# Patient Record
Sex: FEMALE | Race: BLACK OR AFRICAN AMERICAN | NOT HISPANIC OR LATINO | Employment: PART TIME | URBAN - METROPOLITAN AREA
[De-identification: names, ages, dates, MRNs, and addresses within clinical notes are randomized per-mention and may not be internally consistent; named-entity substitution may affect disease eponyms.]

---
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
## 2023-05-23 ENCOUNTER — HOSPITAL ENCOUNTER (EMERGENCY)
Facility: HOSPITAL | Age: 18
Discharge: HOME | End: 2023-05-23
Attending: EMERGENCY MEDICINE
Payer: COMMERCIAL

## 2023-05-23 ENCOUNTER — OFFICE VISIT (OUTPATIENT)
Dept: URGENT CARE | Facility: CLINIC | Age: 18
End: 2023-05-23
Payer: COMMERCIAL

## 2023-05-23 VITALS
WEIGHT: 293 LBS | SYSTOLIC BLOOD PRESSURE: 138 MMHG | HEIGHT: 66 IN | HEART RATE: 87 BPM | BODY MASS INDEX: 47.09 KG/M2 | OXYGEN SATURATION: 100 % | DIASTOLIC BLOOD PRESSURE: 74 MMHG | RESPIRATION RATE: 17 BRPM | TEMPERATURE: 99 F

## 2023-05-23 VITALS
DIASTOLIC BLOOD PRESSURE: 76 MMHG | SYSTOLIC BLOOD PRESSURE: 112 MMHG | TEMPERATURE: 99 F | HEART RATE: 86 BPM | RESPIRATION RATE: 19 BRPM | OXYGEN SATURATION: 97 %

## 2023-05-23 DIAGNOSIS — S06.9X9A HEAD INJURY WITH LOSS OF CONSCIOUSNESS: Primary | ICD-10-CM

## 2023-05-23 DIAGNOSIS — R55 SYNCOPE, UNSPECIFIED SYNCOPE TYPE: ICD-10-CM

## 2023-05-23 DIAGNOSIS — Z91.89 AT RISK FOR LONG QT SYNDROME: ICD-10-CM

## 2023-05-23 DIAGNOSIS — S06.0XAD CONCUSSION WITH UNKNOWN LOSS OF CONSCIOUSNESS STATUS, SUBSEQUENT ENCOUNTER: Primary | ICD-10-CM
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
## 2023-05-23 LAB
ALBUMIN SERPL BCP-MCNC: 3.4 G/DL (ref 3.5–5.2)
ALP SERPL-CCNC: 101 U/L (ref 55–135)
ALT SERPL W/O P-5'-P-CCNC: 11 U/L (ref 10–44)
ANION GAP SERPL CALC-SCNC: 10 MMOL/L (ref 8–16)
AST SERPL-CCNC: 11 U/L (ref 10–40)
BASOPHILS # BLD AUTO: 0.05 K/UL (ref 0–0.2)
BASOPHILS NFR BLD: 0.7 % (ref 0–1.9)
BILIRUB SERPL-MCNC: 0.2 MG/DL (ref 0.1–1)
BUN SERPL-MCNC: 13 MG/DL (ref 6–20)
CALCIUM SERPL-MCNC: 9.5 MG/DL (ref 8.7–10.5)
CHLORIDE SERPL-SCNC: 108 MMOL/L (ref 95–110)
CO2 SERPL-SCNC: 21 MMOL/L (ref 23–29)
CREAT SERPL-MCNC: 0.9 MG/DL (ref 0.5–1.4)
DIFFERENTIAL METHOD: ABNORMAL
EOSINOPHIL # BLD AUTO: 0.2 K/UL (ref 0–0.5)
EOSINOPHIL NFR BLD: 3.3 % (ref 0–8)
ERYTHROCYTE [DISTWIDTH] IN BLOOD BY AUTOMATED COUNT: 16.3 % (ref 11.5–14.5)
EST. GFR  (NO RACE VARIABLE): >60 ML/MIN/1.73 M^2
GLUCOSE SERPL-MCNC: 77 MG/DL (ref 70–110)
HCT VFR BLD AUTO: 38.9 % (ref 37–48.5)
HCV AB SERPL QL IA: NORMAL
HGB BLD-MCNC: 11.8 G/DL (ref 12–16)
HIV 1+2 AB+HIV1 P24 AG SERPL QL IA: NORMAL
IMM GRANULOCYTES # BLD AUTO: 0.02 K/UL (ref 0–0.04)
IMM GRANULOCYTES NFR BLD AUTO: 0.3 % (ref 0–0.5)
INR PPP: 1 (ref 0.8–1.2)
LYMPHOCYTES # BLD AUTO: 2.3 K/UL (ref 1–4.8)
LYMPHOCYTES NFR BLD: 32 % (ref 18–48)
MCH RBC QN AUTO: 23.3 PG (ref 27–31)
MCHC RBC AUTO-ENTMCNC: 30.3 G/DL (ref 32–36)
MCV RBC AUTO: 77 FL (ref 82–98)
MONOCYTES # BLD AUTO: 0.8 K/UL (ref 0.3–1)
MONOCYTES NFR BLD: 11.3 % (ref 4–15)
NEUTROPHILS # BLD AUTO: 3.8 K/UL (ref 1.8–7.7)
NEUTROPHILS NFR BLD: 52.4 % (ref 38–73)
NRBC BLD-RTO: 0 /100 WBC
PLATELET # BLD AUTO: 282 K/UL (ref 150–450)
PMV BLD AUTO: 11.5 FL (ref 9.2–12.9)
POTASSIUM SERPL-SCNC: 3.7 MMOL/L (ref 3.5–5.1)
PROT SERPL-MCNC: 7.6 G/DL (ref 6–8.4)
PROTHROMBIN TIME: 10.9 SEC (ref 9–12.5)
RBC # BLD AUTO: 5.06 M/UL (ref 4–5.4)
SODIUM SERPL-SCNC: 139 MMOL/L (ref 136–145)
WBC # BLD AUTO: 7.28 K/UL (ref 3.9–12.7)

## 2023-05-23 PROCEDURE — 85610 PROTHROMBIN TIME: CPT

## 2023-05-23 PROCEDURE — 93005 ELECTROCARDIOGRAM TRACING: CPT

## 2023-05-23 PROCEDURE — 99285 EMERGENCY DEPT VISIT HI MDM: CPT | Mod: 25

## 2023-05-23 PROCEDURE — 87389 HIV-1 AG W/HIV-1&-2 AB AG IA: CPT | Performed by: PHYSICIAN ASSISTANT

## 2023-05-23 PROCEDURE — 93010 EKG 12-LEAD: ICD-10-PCS | Mod: ,,, | Performed by: INTERNAL MEDICINE

## 2023-05-23 PROCEDURE — 86803 HEPATITIS C AB TEST: CPT | Performed by: PHYSICIAN ASSISTANT

## 2023-05-23 PROCEDURE — 99214 OFFICE O/P EST MOD 30 MIN: CPT | Mod: S$GLB,,, | Performed by: FAMILY MEDICINE

## 2023-05-23 PROCEDURE — 93010 ELECTROCARDIOGRAM REPORT: CPT | Mod: ,,, | Performed by: INTERNAL MEDICINE

## 2023-05-23 PROCEDURE — 99214 PR OFFICE/OUTPT VISIT, EST, LEVL IV, 30-39 MIN: ICD-10-PCS | Mod: S$GLB,,, | Performed by: FAMILY MEDICINE

## 2023-05-23 PROCEDURE — 25000003 PHARM REV CODE 250: Performed by: EMERGENCY MEDICINE

## 2023-05-23 PROCEDURE — 25000003 PHARM REV CODE 250

## 2023-05-23 PROCEDURE — 99285 PR EMERGENCY DEPT VISIT,LEVEL V: ICD-10-PCS | Mod: ,,, | Performed by: EMERGENCY MEDICINE

## 2023-05-23 PROCEDURE — 99285 EMERGENCY DEPT VISIT HI MDM: CPT | Mod: ,,, | Performed by: EMERGENCY MEDICINE

## 2023-05-23 PROCEDURE — 85025 COMPLETE CBC W/AUTO DIFF WBC: CPT

## 2023-05-23 PROCEDURE — 80053 COMPREHEN METABOLIC PANEL: CPT

## 2023-05-23 RX ORDER — ACETAMINOPHEN 325 MG/1
650 TABLET ORAL
Status: COMPLETED | OUTPATIENT
Start: 2023-05-23 | End: 2023-05-23

## 2023-05-23 RX ORDER — IBUPROFEN 600 MG/1
600 TABLET ORAL
Status: COMPLETED | OUTPATIENT
Start: 2023-05-23 | End: 2023-05-23

## 2023-05-23 RX ORDER — TRAZODONE HYDROCHLORIDE 100 MG/1
100-200 TABLET ORAL NIGHTLY
COMMUNITY
Start: 2023-05-13 | End: 2024-02-26

## 2023-05-23 RX ORDER — IBUPROFEN 600 MG/1
TABLET ORAL
Status: DISCONTINUED
Start: 2023-05-23 | End: 2023-05-23 | Stop reason: HOSPADM

## 2023-05-23 RX ORDER — ACETAMINOPHEN 325 MG/1
650 TABLET ORAL ONCE
Status: DISCONTINUED | OUTPATIENT
Start: 2023-05-23 | End: 2023-05-23 | Stop reason: HOSPADM

## 2023-05-23 RX ADMIN — IBUPROFEN 600 MG: 600 TABLET, FILM COATED ORAL at 09:05

## 2023-05-23 RX ADMIN — ACETAMINOPHEN 650 MG: 325 TABLET ORAL at 07:05

## 2023-05-23 NOTE — PROGRESS NOTES
"Subjective:      Patient ID: Alec Araya is a 17 y.o. female.    Vitals:  temperature is 98.7 °F (37.1 °C). Her blood pressure is 112/76 and her pulse is 86. Her respiration is 19 and oxygen saturation is 97%.     Chief Complaint: Fall    Pt is a  18 yo F who presents to clinic after an unwitnessed fall with loss of consciousness about 12 hours ago after taking trazadone.  Patient reports headache, loss of hearing, and a "groggy feeling"    Fall  The accident occurred 6 to 12 hours ago. The fall occurred while standing. She fell from a height of 1 to 2 ft. She landed on Carpet. There was no blood loss. The point of impact was the head (Back and Shoulder). The pain is present in the head, back and right shoulder. The pain is at a severity of 5/10. The pain is mild. The symptoms are aggravated by movement. Associated symptoms include headaches and a loss of consciousness. Pertinent negatives include no abdominal pain, bowel incontinence, fever, hearing loss, hematuria, nausea, tingling, visual change or vomiting. She has tried rest for the symptoms. The treatment provided no relief.     Constitution: Negative for fever.   Gastrointestinal:  Negative for abdominal pain, nausea, vomiting and bowel incontinence.   Genitourinary:  Negative for hematuria.   Neurological:  Positive for headaches and loss of consciousness.    Objective:     Physical Exam   Constitutional: She is oriented to person, place, and time. She appears well-developed. She is cooperative.  Non-toxic appearance. She does not appear ill. No distress.   HENT:   Head: Normocephalic and atraumatic. Head is without abrasion, without contusion and without laceration.       Ears:   Right Ear: Hearing, tympanic membrane, external ear and ear canal normal. No hemotympanum.   Left Ear: Hearing, tympanic membrane, external ear and ear canal normal. No hemotympanum.   Nose: Nose normal. No mucosal edema, rhinorrhea or nasal deformity. No epistaxis. " Right sinus exhibits no maxillary sinus tenderness and no frontal sinus tenderness. Left sinus exhibits no maxillary sinus tenderness and no frontal sinus tenderness.   Mouth/Throat: Uvula is midline, oropharynx is clear and moist and mucous membranes are normal. No trismus in the jaw. Normal dentition. No uvula swelling. No posterior oropharyngeal erythema.   Eyes: Conjunctivae, EOM and lids are normal. Pupils are equal, round, and reactive to light. Right eye exhibits no discharge. Left eye exhibits no discharge. No scleral icterus. Right pupil is sluggish.   Neck: Trachea normal and phonation normal. Neck supple. No tracheal deviation present. No neck rigidity present. No spinous process tenderness present. No muscular tenderness present.   Cardiovascular: Normal rate, regular rhythm, normal heart sounds and normal pulses.   Pulmonary/Chest: Effort normal and breath sounds normal. No respiratory distress.   Abdominal: Normal appearance and bowel sounds are normal. She exhibits no distension and no mass. Soft. There is no abdominal tenderness.   Musculoskeletal: Normal range of motion.         General: No deformity. Normal range of motion.   Neurological: She is alert and oriented to person, place, and time. She has normal strength. No cranial nerve deficit or sensory deficit. She exhibits normal muscle tone. She displays no seizure activity. Coordination normal. GCS eye subscore is 4. GCS verbal subscore is 5. GCS motor subscore is 6.   Skin: Skin is warm, dry, intact, not diaphoretic and not pale. Capillary refill takes less than 2 seconds. No abrasion, No burn, No bruising and No ecchymosis   Psychiatric: Her speech is normal and behavior is normal. Judgment and thought content normal.   Nursing note and vitals reviewed.    Assessment:     1. Head injury with loss of consciousness        Plan:       Head injury with loss of consciousness           PAtient advised to proceed to ER for further evaluation

## 2023-05-23 NOTE — ED NOTES
Patient identifiers for Alec Araya 17 y.o. female checked and correct.  Chief Complaint   Patient presents with    Fall     Patient states she fell after taking Trazadone, struck head on an iron post, sent from urgent care for further eval/possible concussion     Past Medical History:   Diagnosis Date    ADHD      Allergies reported:   Review of patient's allergies indicates:   Allergen Reactions    Penicillins          LOC: Patient is awake, alert, and aware of environment with an appropriate affect. Patient is oriented x 4 and speaking appropriately.  APPEARANCE: Patient resting comfortably and in no acute distress. Patient is clean and well groomed, patient's clothing is properly fastened.  HEENT: - JVD, + midline trach  SKIN: The skin is warm and dry. Patient has normal skin turgor and moist mucus membranes.   MUSKULOSKELETAL: Patient is moving all extremities well, no obvious deformities noted. Pulses intact. PMS x 4  RESPIRATORY: Airway is open and patent. Respirations are spontaneous and non-labored with normal effort and rate. = CBBS  CARDIAC: No peripheral edema noted. + 2 bilateral pedal and radial pulses, < 3 s cap refill  ABDOMEN: No distention noted. Soft and non-tender upon palpation.  NEUROLOGICAL: pupils 4 mm, PERRL. Facial expression is symmetrical. Hand grasps are equal bilaterally. Normal sensation in all extremities when touched with finger. Endorses 5/10 HA, denies numbness or tingling to extremities and vision changes.

## 2023-05-23 NOTE — ED TRIAGE NOTES
16 y/o F presents to ER with c/c syncope. Pt states that she lost consciousness last night after taking a trazodone and hit her head. Pt currently endorses 5/10 HA, but denies any numbness or tingling to exrtemities or vision changes.

## 2023-05-23 NOTE — ED PROVIDER NOTES
Encounter Date: 5/23/2023       History     Chief Complaint   Patient presents with    Fall     Patient states she fell after taking Trazadone, struck head on an iron post, sent from urgent care for further eval/possible concussion     Ms. Araya is a 17yoF with pmhx of bipolar disorder and ADHD who presented due to fall after taking trazodone x2. Patient stated she was recently prescribed trazodone. She reported vertigo shortly after taking trazodone, which caused her to fall. Patient struck her head on the bedpost and stated she lost consciousness. Unknown time of lost consciousness. Stated she woke up with head, right shoulder, right elbow, right wrist, and right hip pain. Also reported associated diplopia. Incident occurred at approximately 12am. Patient had reported to [facility] urgent care earlier today but was advised to report to [facility]. No images were performed. Patient denied nausea, vomiting, and confusion.     The history is provided by the patient. No  was used.   Review of patient's allergies indicates:   Allergen Reactions    Penicillins      Past Medical History:   Diagnosis Date    ADHD      History reviewed. No pertinent surgical history.  Family History   Problem Relation Age of Onset    Hypertension Paternal Grandmother     Diabetes Maternal Grandmother     Hypertension Maternal Grandmother     Hyperlipidemia Maternal Grandfather     Prostate cancer Maternal Grandfather     Hypertension Father     Eczema Mother     Asthma Mother      Social History     Tobacco Use    Smoking status: Never    Smokeless tobacco: Never   Substance Use Topics    Alcohol use: Never    Drug use: Never     Review of Systems   Constitutional:  Negative for diaphoresis and fever.   HENT:  Negative for nosebleeds.    Eyes:  Positive for visual disturbance. Negative for photophobia, pain and redness.   Respiratory:  Negative for chest tightness, shortness of breath and wheezing.    Cardiovascular:  Negative  for chest pain, palpitations and leg swelling.   Gastrointestinal:  Negative for abdominal pain, nausea and vomiting.   Musculoskeletal:  Positive for arthralgias and neck pain. Negative for back pain, gait problem, joint swelling and neck stiffness.   Skin:  Negative for rash and wound.   Neurological:  Positive for dizziness, syncope and headaches. Negative for speech difficulty and weakness.   Psychiatric/Behavioral:  Negative for confusion.      Physical Exam     Initial Vitals [05/23/23 1620]   BP Pulse Resp Temp SpO2   130/73 94 15 98 °F (36.7 °C) 99 %      MAP       --         Physical Exam    Constitutional: She appears well-developed and well-nourished.   HENT:   Head: Normocephalic. Head is with contusion.   Eyes: Conjunctivae and EOM are normal. Right pupil is round and reactive. Left pupil is round and reactive. Pupils are unequal.   Neck: Neck supple.   Normal range of motion.  Cardiovascular:  Normal rate and regular rhythm.           Pulmonary/Chest: Breath sounds normal. No respiratory distress. She has no wheezes. She exhibits no tenderness.   Abdominal: Abdomen is soft. Bowel sounds are normal. There is no abdominal tenderness.   Musculoskeletal:         General: Tenderness (R shoulder, R elbow, R wrist, R hip) present. No edema.      Cervical back: Normal range of motion and neck supple.     Neurological: She is alert and oriented to person, place, and time. She has normal strength.   Skin: Skin is warm and dry. Capillary refill takes less than 2 seconds.   Psychiatric: She has a normal mood and affect.       ED Course   Procedures  Labs Reviewed   CBC W/ AUTO DIFFERENTIAL - Abnormal; Notable for the following components:       Result Value    Hemoglobin 11.8 (*)     MCV 77 (*)     MCH 23.3 (*)     MCHC 30.3 (*)     RDW 16.3 (*)     All other components within normal limits   COMPREHENSIVE METABOLIC PANEL - Abnormal; Notable for the following components:    CO2 21 (*)     Albumin 3.4 (*)     All  other components within normal limits   HIV 1 / 2 ANTIBODY    Narrative:     Release to patient->Immediate   HEPATITIS C ANTIBODY    Narrative:     Release to patient->Immediate   PROTIME-INR     EKG Readings: (Independently Interpreted)   Initial Reading: No STEMI. Rhythm: Normal Sinus Rhythm. Heart Rate: 73. ST Segments: Normal ST Segments. T Waves: Normal. Axis: Normal.   ECG Results              EKG 12-lead (Final result)  Result time 05/24/23 10:04:27      Final result by Interface, Lab In [facility] (05/24/23 10:04:27)                   Narrative:    Test Reason : Z91.89,    Vent. Rate : 073 BPM     Atrial Rate : 073 BPM     P-R Int : 168 ms          QRS Dur : 096 ms      QT Int : 384 ms       P-R-T Axes : 074 078 061 degrees     QTc Int : 423 ms    Normal sinus rhythm with sinus arrhythmia  Normal ECG  No previous ECGs available  Confirmed by Claus David MD ([id number]) on 5/24/2023 10:04:20 AM    Referred By: [email or web address]   SELF           Confirmed By:Claus David MD                                  Imaging Results              X-Ray Elbow Complete Right (Final result)  Result time 05/23/23 20:19:15      Final result by Beto Hoover MD (05/23/23 20:19:15)                   Impression:      No joint effusion.      Electronically signed by: Beto Hoover  Date:    05/23/2023  Time:    20:19               Narrative:    EXAMINATION:  XR ELBOW COMPLETE 3 VIEW RIGHT    CLINICAL HISTORY:  . Unspecified fall, initial encounter    TECHNIQUE:  AP, lateral, and oblique views of the right elbow were performed.    COMPARISON:  None    FINDINGS:  No acute fracture, dislocation, or traumatic malalignment.  Joint spaces are maintained.  No joint effusion.                                       CT Head Without Contrast (Final result)  Result time 05/23/23 19:42:51      Final result by Vern Milligan MD (05/23/23 19:42:51)                   Impression:      1. No acute intracranial abnormalities.      Electronically  signed by: Vern Milligan MD  Date:    05/23/2023  Time:    19:42               Narrative:    EXAMINATION:  CT HEAD WITHOUT CONTRAST    CLINICAL HISTORY:  Trauma;    TECHNIQUE:  Low dose axial images were obtained through the head.  Coronal and sagittal reformations were also performed. Contrast was not administered.    COMPARISON:  None.    FINDINGS:  There is motion artifact.    There is no evidence of acute major vascular territory infarct, hemorrhage, or mass.  There is no hydrocephalus.  There are no abnormal extra-axial fluid collections.  The paranasal sinuses and mastoid air cells are clear, and there is no evidence of calvarial fracture.  The visualized soft tissues are unremarkable.                                       CT Cervical Spine Without Contrast (Final result)  Result time 05/23/23 19:47:35      Final result by Vern Milligan MD (05/23/23 19:47:35)                   Impression:      1. Allowing for motion artifact, no convincing acute displaced fracture or dislocation of the cervical spine.  Please see above for additional findings.      Electronically signed by: Vern Milligan MD  Date:    05/23/2023  Time:    19:47               Narrative:    EXAMINATION:  CT CERVICAL SPINE WITHOUT CONTRAST    CLINICAL HISTORY:  Trauma;    TECHNIQUE:  Low dose axial images, sagittal and coronal reformations were performed though the cervical spine.  Contrast was not administered.    COMPARISON:  None    FINDINGS:  There is motion artifact.    Images of the lung apices are grossly unremarkable allowing for motion artifact.  The thyroid gland, parotid glands, and remaining visualized salivary glands are grossly unremarkable.  There are several scattered nonenlarged although numerous cervical chain lymph nodes bilaterally.  No significant tonsillar enlargement.  The posterior paraspinous and hypopharyngeal soft tissues are unremarkable.  The airway is patent.    Sagittal reformatted imaging demonstrates  adequate alignment of the cervical spine without significant vertebral body height loss or disc space height loss.  The facet joints are aligned.  AP spinal alignment is unremarkable.                                       X-Ray Shoulder Complete 2 View Right (Final result)  Result time 05/23/23 19:39:25      Final result by Vern Milligan MD (05/23/23 19:39:25)                   Impression:      1. No acute displaced fracture or dislocation of the right shoulder.      Electronically signed by: Vern Milligan MD  Date:    05/23/2023  Time:    19:39               Narrative:    EXAMINATION:  XR SHOULDER COMPLETE 2 OR MORE VIEWS RIGHT    CLINICAL HISTORY:  Unspecified fall, initial encounter    TECHNIQUE:  Two or three views of the right shoulder were performed.    COMPARISON:  None    FINDINGS:  Three views right shoulder.    The right humeral head maintains appropriate relationship with the glenoid.  The acromioclavicular joint is intact.  No acute displaced right rib fracture.  The right lung zones are clear.                                       X-Ray Wrist Complete Right (Final result)  Result time 05/23/23 19:37:57      Final result by Vern Milligan MD (05/23/23 19:37:57)                   Impression:      1. No acute displaced fracture or dislocation of the right wrist.      Electronically signed by: Vern Milligan MD  Date:    05/23/2023  Time:    19:37               Narrative:    EXAMINATION:  XR WRIST COMPLETE 3 VIEWS RIGHT    CLINICAL HISTORY:  Unspecified fall, initial encounter    TECHNIQUE:  PA, lateral, and oblique views of the right wrist were performed.    COMPARISON:  None    FINDINGS:  Three views right wrist.    No acute displaced fracture or dislocation of the right wrist.  No radiopaque foreign body.  There is edema about the dorsal aspect of the hand.                                       X-Ray Hip 2 or 3 views Right (with Pelvis when performed) (Final result)  Result time 05/23/23  19:37:21      Final result by Vern Milligan MD (05/23/23 19:37:21)                   Impression:      1. No acute displaced fracture or dislocation of the right hip.      Electronically signed by: Vern Milligan MD  Date:    05/23/2023  Time:    19:37               Narrative:    EXAMINATION:  XR HIP WITH PELVIS WHEN PERFORMED, 2 OR 3  VIEWS RIGHT    CLINICAL HISTORY:  fall;    TECHNIQUE:  AP view of the pelvis and frog leg lateral view of the right hip were performed.    COMPARISON:  None    FINDINGS:  Two views right hip.    The bilateral sacroiliac joints are intact.  The pubic symphysis is intact.  The bilateral femoroacetabular joints are intact.                                    X-Rays:   Independently Interpreted Readings:   Head CT: No hemorrhage.  No skull fracture.   Other Readings:  Cervical spine CT: no fracture or dislocation  Medications   acetaminophen tablet 650 mg (650 mg Oral Given 5/23/23 1939)   ibuprofen tablet 600 mg (600 mg Oral Given 5/23/23 2102)     Medical Decision Making:   History:   Old Medical Records: I decided to obtain old medical records.  Old Records Summarized: records from clinic visits.  Initial Assessment:   Ms. Araya presented due to pain secondary to trauma from fall. Fall occurred shortly after taking trazodone. Patient reported LOC. Also reported head, right shoulder, right elbow, right wrist, and right hip pain. Ordered images to assess for intracranial hemorrhage and/or fractures.   Differential Diagnosis:   1. Intracranial hemorrhage  2. Contusion  3. Fracture  4. Drug effect  Independently Interpreted Test(s):   I have ordered and independently interpreted X-rays - see prior notes.  I have ordered and independently interpreted EKG Reading(s) - see prior notes  Clinical Tests:   Lab Tests: Ordered  Radiological Study: Ordered  Medical Tests: Ordered  ED Management:  Labs and imaging unremarkable. CTH showed no acute intracranial abnormalities. CT C-spine,  shoulder x-ray, elbow x-ray, wrist x-ray, and hip x-ray showed no fracture or dislocation.   Discussed results with patient. Advised follow up with PCP/psychiatrist and to hold trazodone. She expressed verbal understanding.   Other:   I discussed test(s) with the performing physician.  I have discussed this case with another health care provider.          Attending Attestation:   Physician Attestation Statement for Resident:  As the supervising MD   Physician Attestation Statement: I have personally seen and examined this patient.   I agree with the above history.  -:   As the supervising MD I agree with the above PE.     As the supervising MD I agree with the above treatment, course, plan, and disposition.    I have reviewed and agree with the residents interpretation of the following: lab data, x-rays, CT scans, EKG and rhythm strips.                            Clinical Impression:   Final diagnoses:  [Z91.89] At risk for long QT syndrome  [W19.XXXA] Fall  [R55] Syncope, unspecified syncope type  [S06.0XAD] Concussion with unknown loss of consciousness status, subsequent encounter (Primary)        ED Disposition Condition    Discharge Stable          ED Prescriptions    None       Follow-up Information    None          Beba Gaines DO  Resident  05/23/23 3535       Rony Castle MD  05/24/23 0119

## 2023-05-24 NOTE — DISCHARGE INSTRUCTIONS
Please follow up with your PCP and do NOT resume trazodone unless outpatient physician advise restarting.

## 2023-11-17 ENCOUNTER — HOSPITAL ENCOUNTER (EMERGENCY)
Facility: HOSPITAL | Age: 18
Discharge: HOME | End: 2023-11-18
Attending: EMERGENCY MEDICINE
Payer: COMMERCIAL

## 2023-11-17 DIAGNOSIS — R00.0 TACHYCARDIA: ICD-10-CM

## 2023-11-17 DIAGNOSIS — F12.920 CANNABIS INTOXICATION WITHOUT COMPLICATION: Primary | ICD-10-CM

## 2023-11-17 PROCEDURE — 81025 URINE PREGNANCY TEST: CPT | Performed by: EMERGENCY MEDICINE

## 2023-11-17 PROCEDURE — 99283 EMERGENCY DEPT VISIT LOW MDM: CPT

## 2023-11-18 VITALS
HEART RATE: 88 BPM | OXYGEN SATURATION: 98 % | SYSTOLIC BLOOD PRESSURE: 134 MMHG | DIASTOLIC BLOOD PRESSURE: 76 MMHG | TEMPERATURE: 99 F | RESPIRATION RATE: 17 BRPM

## 2023-11-18 LAB
B-HCG UR QL: NEGATIVE
CTP QC/QA: YES

## 2023-11-18 PROCEDURE — 99283 EMERGENCY DEPT VISIT LOW MDM: CPT

## 2023-11-18 PROCEDURE — 25000003 PHARM REV CODE 250

## 2023-11-18 PROCEDURE — 93005 ELECTROCARDIOGRAM TRACING: CPT

## 2023-11-18 PROCEDURE — 93010 ELECTROCARDIOGRAM REPORT: CPT | Mod: ,,, | Performed by: INTERNAL MEDICINE

## 2023-11-18 PROCEDURE — 93010 EKG 12-LEAD: ICD-10-PCS | Mod: ,,, | Performed by: INTERNAL MEDICINE

## 2023-11-18 RX ORDER — ONDANSETRON 4 MG/1
4 TABLET, ORALLY DISINTEGRATING ORAL
Status: COMPLETED | OUTPATIENT
Start: 2023-11-18 | End: 2023-11-18

## 2023-11-18 RX ADMIN — ONDANSETRON 4 MG: 4 TABLET, ORALLY DISINTEGRATING ORAL at 12:11

## 2023-11-18 NOTE — DISCHARGE INSTRUCTIONS
You were seen in the ED for cannabis intoxication. Symptoms of nausea and tachycardia improved during your stay. Return to the ED if symptoms worse. Follow up with your pcp as needed.

## 2023-11-18 NOTE — ED TRIAGE NOTES
Alec Araya, a 18 y.o. female presents to the ED w/ complaint of mariajuana intoxication. 25 mg edible @ 1900 weakness and nausea. A+ O X 4.     Triage note:  Chief Complaint   Patient presents with    marijuana intoxication     25 mg edible @ 1900 weakness and nausea. A+ O X 4.      Review of patient's allergies indicates:   Allergen Reactions    Penicillins      Past Medical History:   Diagnosis Date    ADHD    Patient identifiers for Alec Araya checked and correct.    LOC: The patient is awake, alert and aware of environment with an appropriate affect, the patient is oriented x 4 and speaking appropriately.    APPEARANCE: Patient resting comfortably and in no acute distress, patient is clean and well groomed, patient's clothing is properly fastened.    SKIN: The skin is warm and dry, color consistent with ethnicity, patient has normal skin turgor and moist mucus membranes, skin intact, no breakdown or bruising noted.    MUSCULOSKELETAL: Patient moving all extremities well, no obvious swelling or deformities noted.    RESPIRATORY: Airway is open and patent, respirations are spontaneous and even, patient has a normal effort and rate.    CARDIAC: Patient has a normal rate and rhythm, no periphreal edema noted, capillary refill < 3 seconds.    ABDOMEN: Soft and non tender to palpation, no distention noted. Patient has some nausea, vomiting, but no diarrhea, or constipation.     NEUROLOGIC: Eyes open spontaneously, PERRL, behavior appropriate to situation, follows commands, facial expression symmetrical, bilateral hand grasp equal and even, purposeful motor response noted, normal sensation in all extremities.     HEENT: No abnormalities noted. White sclera and pupils equal round and reactive to light. Denies headache, dizziness.     : Pt voids independently, denies dysuria, hematuria, frequency.

## 2023-11-18 NOTE — ED PROVIDER NOTES
Encounter Date: 11/17/2023       History     Chief Complaint   Patient presents with    marijuana intoxication     25 mg edible @ 1900 weakness and nausea. A+ O X 4.      HPI  Alec Araya is a 18 y.o. female with a history of anxiety and bipolar disorder presents with complaint of shortness of breath abdominal pain after taking the 25 mg marijuana edible.  She felt like she was on a trip after.  She reported this was the 1st use.  She denied tobacco IV drug use.  She endorses social alcohol use.  She denied chest pain fever, nausea or vomiting.    Review of patient's allergies indicates:   Allergen Reactions    Penicillins      Past Medical History:   Diagnosis Date    ADHD      History reviewed. No pertinent surgical history.  Family History   Problem Relation Age of Onset    Hypertension Paternal Grandmother     Diabetes Maternal Grandmother     Hypertension Maternal Grandmother     Hyperlipidemia Maternal Grandfather     Prostate cancer Maternal Grandfather     Hypertension Father     Eczema Mother     Asthma Mother      Social History     Tobacco Use    Smoking status: Never    Smokeless tobacco: Never   Substance Use Topics    Alcohol use: Never    Drug use: Yes     Types: Marijuana     Review of Systems    Physical Exam     Initial Vitals [11/17/23 2227]   BP Pulse Resp Temp SpO2   118/75 (!) 123 (!) 22 98.1 °F (36.7 °C) 99 %      MAP       --         Physical Exam    Constitutional: She appears well-developed and well-nourished.   HENT:   Head: Normocephalic and atraumatic.   Eyes: EOM are normal. Pupils are equal, round, and reactive to light.   Neck: Neck supple.   Normal range of motion.  Cardiovascular:  Regular rhythm, normal heart sounds and intact distal pulses.     Exam reveals no gallop and no friction rub.       No murmur heard.  tachycardia   Pulmonary/Chest: Breath sounds normal. No respiratory distress. She has no wheezes. She has no rhonchi. She has no rales. She exhibits no  tenderness.   Abdominal: Abdomen is soft. Bowel sounds are normal. She exhibits no distension and no mass. There is abdominal tenderness.   LUQ   There is no rebound and no guarding.   Musculoskeletal:         General: No tenderness or edema. Normal range of motion.      Cervical back: Normal range of motion and neck supple.     Neurological: She is alert and oriented to person, place, and time.         ED Course   Procedures  Labs Reviewed   POCT URINE PREGNANCY     EKG Readings: (Independently Interpreted)   Normal sinus rhythm.  Tachycardic. Rate 100. No st changes concerning for ischemia       Imaging Results    None          Medications - No data to display  Medical Decision Making  Differentials: Recreational drug abuse. Withdrawal    Alec Araya is a 18 y.o. female with a history of anxiety and bipolar disorder presents with complaint of shortness of breath abdominal pain after taking the 25 mg marijuana edible.  Vitals revealed tachycardia with saturation greater than 95%. EKG revealed normal sinus rhythm with tachycardia.  Repeat vitals showed improve rate.  Gave her a dose of Zofran and she reported improved nausea. Dc home in stable condition    Risk  Prescription drug management.              Attending Attestation:   Physician Attestation Statement for Resident:  As the supervising MD   Physician Attestation Statement: I have personally seen and examined this patient.   I agree with the above history.  -:   As the supervising MD I agree with the above PE.     As the supervising MD I agree with the above treatment, course, plan, and disposition.                                        Clinical Impression:  Final diagnoses:  [R00.0] Tachycardia                 Noam Garcia MD  Resident  11/18/23 0111       Marysol Callahan MD  11/18/23 0249

## 2023-11-18 NOTE — ED NOTES
Bed: [facility]  Expected date:   Expected time:   Means of arrival:   Comments:  Marshal

## 2024-02-22 NOTE — PROGRESS NOTES
Chief Complaint: Birth control consult     HPI:      Alec is a 18 y.o.  who presents today for AUB w/ DMPA and for a birth control consult. She started DMPA in 2023. No bleeding in Nov and Dec. In  had bleeding from 16-29th. She is also concerned about weight gain w/ DMPA.     Menarche at age 11   When she was a freshman in high school - went 2 mths w/o period - otherwise denies hx of abnormal periods   Typically - periods monthly, last 3-7 days w/ 2-3 days of heavier flow and cramping     C/ abnormal hair growth on her chin - she currently shaves it     Denies hx of HTN, clotting disorders, liver disease  C/o migraines w/o aura  She denies family history of clotting disorder    Currently on Prozac, Abilify d/t hx of bipolar disorder.     Alec is currently sexually active with a single male partner. She is currently using Depo-Provera for contraception. Desires full STI panel. Hx of CT 2 mths ago - was treated at the time. Endorses new partner since last had screening done.       Physical Exam:      PHYSICAL EXAM:  /80   Pulse 97   Wt (!) 159.5 kg (351 lb 10.1 oz)   LMP 2024   BMI 56.76 kg/m²   Body mass index is 56.76 kg/m².     APPEARANCE: Well nourished, well developed, in no acute distress.  PELVIC:  Deferred     Assessment/Plan:     Contraceptive education  -     POCT Urine Pregnancy    Oral contraception initial prescription    Routine screening for STI (sexually transmitted infection)  -     C. trachomatis/N. gonorrhoeae by AMP DNA [facility]; Vagina  -     Hepatitis B Surface Antigen; Future; Expected date: 2024  -     HIV 1/2 Ag/Ab (4th Gen); Future; Expected date: 2024  -     HEPATITIS C ANTIBODY; Future; Expected date: 2024  -     RPR; Future; Expected date: 2024    Hirsutism  -     Testosterone; Future; Expected date: 2024  -     DHEA-Sulfate; Future; Expected date: 2024      PLAN:    After discussing R/B/As of all contraceptive  options, pt desires trial of COCs.   Counseled patient on how to start the pill (quick start vs. w/ next menstrual cycle), how to take the pill and what to do if she misses a pill.   Counseled patient on expected s/e including BTB, nausea, and breast tenderness and that these s/e tend to improve over the first 2-3 months.  Counseled pt on rare risk of CVD (no CI noted to use) and reviewed symptoms to be aware of.   Will check androgen levels d/t hirsutism.  GC/CT swab self collected by patient. Full serum STI panel ordered.    Follow up in about 3 months (around 5/26/2024).

## 2024-02-26 ENCOUNTER — LAB VISIT (OUTPATIENT)
Dept: LAB | Facility: HOSPITAL | Age: 19
End: 2024-02-26
Payer: COMMERCIAL

## 2024-02-26 ENCOUNTER — OFFICE VISIT (OUTPATIENT)
Dept: OBSTETRICS AND GYNECOLOGY | Facility: CLINIC | Age: 19
End: 2024-02-26
Payer: COMMERCIAL

## 2024-02-26 VITALS
SYSTOLIC BLOOD PRESSURE: 123 MMHG | HEART RATE: 97 BPM | DIASTOLIC BLOOD PRESSURE: 80 MMHG | WEIGHT: 293 LBS | BODY MASS INDEX: 56.76 KG/M2

## 2024-02-26 DIAGNOSIS — Z30.09 CONTRACEPTIVE EDUCATION: Primary | ICD-10-CM

## 2024-02-26 DIAGNOSIS — L68.0 HIRSUTISM: ICD-10-CM

## 2024-02-26 DIAGNOSIS — Z11.3 ROUTINE SCREENING FOR STI (SEXUALLY TRANSMITTED INFECTION): ICD-10-CM

## 2024-02-26 DIAGNOSIS — Z30.011 ORAL CONTRACEPTION INITIAL PRESCRIPTION: ICD-10-CM

## 2024-02-26 PROBLEM — F31.9 BIPOLAR DISORDER: Status: ACTIVE | Noted: 2023-06-09

## 2024-02-26 LAB
B-HCG UR QL: NEGATIVE
CTP QC/QA: YES
DHEA-S SERPL-MCNC: 116.5 UG/DL (ref 134.2–407.4)
HBV SURFACE AG SERPL QL IA: NORMAL
HCV AB SERPL QL IA: NORMAL
HIV 1+2 AB+HIV1 P24 AG SERPL QL IA: NORMAL
TESTOST SERPL-MCNC: 73 NG/DL (ref 5–73)

## 2024-02-26 PROCEDURE — 99214 OFFICE O/P EST MOD 30 MIN: CPT | Mod: S$GLB,,, | Performed by: NURSE PRACTITIONER

## 2024-02-26 PROCEDURE — 84403 ASSAY OF TOTAL TESTOSTERONE: CPT | Performed by: NURSE PRACTITIONER

## 2024-02-26 PROCEDURE — 86803 HEPATITIS C AB TEST: CPT | Performed by: NURSE PRACTITIONER

## 2024-02-26 PROCEDURE — 3079F DIAST BP 80-89 MM HG: CPT | Mod: CPTII,S$GLB,, | Performed by: NURSE PRACTITIONER

## 2024-02-26 PROCEDURE — 3074F SYST BP LT 130 MM HG: CPT | Mod: CPTII,S$GLB,, | Performed by: NURSE PRACTITIONER

## 2024-02-26 PROCEDURE — 82627 DEHYDROEPIANDROSTERONE: CPT | Performed by: NURSE PRACTITIONER

## 2024-02-26 PROCEDURE — 3008F BODY MASS INDEX DOCD: CPT | Mod: CPTII,S$GLB,, | Performed by: NURSE PRACTITIONER

## 2024-02-26 PROCEDURE — 99999 PR PBB SHADOW E&M-EST. PATIENT-LVL III: CPT | Mod: PBBFAC,,, | Performed by: NURSE PRACTITIONER

## 2024-02-26 PROCEDURE — 87491 CHLMYD TRACH DNA AMP PROBE: CPT | Performed by: NURSE PRACTITIONER

## 2024-02-26 PROCEDURE — 87389 HIV-1 AG W/HIV-1&-2 AB AG IA: CPT | Performed by: NURSE PRACTITIONER

## 2024-02-26 PROCEDURE — 1159F MED LIST DOCD IN RCRD: CPT | Mod: CPTII,S$GLB,, | Performed by: NURSE PRACTITIONER

## 2024-02-26 PROCEDURE — 36415 COLL VENOUS BLD VENIPUNCTURE: CPT | Performed by: NURSE PRACTITIONER

## 2024-02-26 PROCEDURE — 87340 HEPATITIS B SURFACE AG IA: CPT | Performed by: NURSE PRACTITIONER

## 2024-02-26 PROCEDURE — 86592 SYPHILIS TEST NON-TREP QUAL: CPT | Performed by: NURSE PRACTITIONER

## 2024-02-26 PROCEDURE — 81025 URINE PREGNANCY TEST: CPT | Mod: S$GLB,,, | Performed by: NURSE PRACTITIONER

## 2024-02-26 RX ORDER — ARIPIPRAZOLE 10 MG/1
10 TABLET ORAL
COMMUNITY

## 2024-02-26 RX ORDER — DROSPIRENONE AND ETHINYL ESTRADIOL 0.02-3(28)
1 KIT ORAL DAILY
Qty: 84 TABLET | Refills: 3 | Status: SHIPPED | OUTPATIENT
Start: 2024-02-26 | End: 2025-02-25

## 2024-02-26 RX ORDER — FLUOXETINE HYDROCHLORIDE 20 MG/1
20 CAPSULE ORAL
COMMUNITY

## 2024-02-27 LAB
C TRACH DNA SPEC QL NAA+PROBE: NOT DETECTED
N GONORRHOEA DNA SPEC QL NAA+PROBE: NOT DETECTED
RPR SER QL: NORMAL

## 2024-04-24 ENCOUNTER — PATIENT MESSAGE (OUTPATIENT)
Dept: OBSTETRICS AND GYNECOLOGY | Facility: CLINIC | Age: 19
End: 2024-04-24
Payer: COMMERCIAL

## 2024-04-29 NOTE — PROGRESS NOTES
"  Chief Complaint: OCP F/u     HPI:      Alec is a 18 y.o.  who presents today for follow-up on OCPs. She was seen on 2024 for birth control consult. She opted to switch from DMPA to OCPs at that time. Started on drospirenone-ethinyl estradioL (ARETHA) 3-0.02 mg per tablet due to concurrent complaint of hirsutism.     She had no bleeding during the first pill packet. Then towards the end of the 2nd pill packet and in to the 3rd pill packet she had 7 days of heavy bleeding with cramping. She was changing her pad 2-3 times per day on heavy flow days. She took Midol with relief but did not want to take at night because it had caffeine in it.    She has otherwise tolerated pill well. She noticed nausea, fatigue, headaches when she first started pills but doing fine now. She has not noticed an improvement in the hair growth on her chin. She does have an appointment with Dermatology in  for further evaluation of this.   She is remembering to take her birth control pill every day.     Alec  is currently sexually active . She is currently using oral contraceptives (estrogen/progesterone) for contraception. She desires full STI screening today.     She is also c/o vaginal discharge, mild vaginal itching/irritation. Denies any vaginal odor or pelvic pain.     Gardasil:Completed     Physical Exam:      PHYSICAL EXAM:  /82   Ht 5' 6" (1.676 m)   Wt (!) 160.5 kg (353 lb 13.4 oz)   LMP 2024   BMI 57.11 kg/m²   Body mass index is 57.11 kg/m².     APPEARANCE: Well nourished, well developed, in no acute distress.  PELVIC:  External genitalia and urethra within normal limits. Vagina without lesions, without discharge, without erythema, without ulcers.  Cervix without cervical motion tenderness, non-friable. Bimanual exam deferred    A female chaperone was present for the pelvic exam.     Assessment/Plan:     Oral contraceptive pill surveillance    Routine screening for STI (sexually transmitted " infection)  -     C. trachomatis/N. gonorrhoeae by AMP DNA [facility]; Cervicovaginal  -     Vaginosis Screen by DNA Probe  -     Hepatitis B Surface Antigen; Future; Expected date: 04/30/2024  -     Hepatitis C Antibody; Future; Expected date: 04/30/2024  -     HIV 1/2 Ag/Ab (4th Gen); Future; Expected date: 04/30/2024  -     Treponema Pallidium Antibodies IgG, IgM; Future; Expected date: 04/30/2024    Vaginal discharge  -     C. trachomatis/N. gonorrhoeae by AMP DNA [facility]; Cervicovaginal  -     Vaginosis Screen by DNA Probe    Negative pregnancy test  -     POCT Urine Pregnancy    PLAN:    Counseled patient on option to either do extended or continuous cycling of OCPs to allow bleeding every 3 months or suppress menses completely. Also discussed option of increasing estrogen potency if she continues to have breakthrough bleeding. Patient declines at this time and desires to continue with regular dosing of her current OCP. Has refilled available.   Affirm collected for further evaluation of vaginitis symptoms.   GC/CT collected and serum STI screening ordered.     Follow up if symptoms worsen or fail to improve, for WWE in 2/2024 .

## 2024-04-30 ENCOUNTER — LAB VISIT (OUTPATIENT)
Dept: LAB | Facility: HOSPITAL | Age: 19
End: 2024-04-30
Payer: COMMERCIAL

## 2024-04-30 ENCOUNTER — OFFICE VISIT (OUTPATIENT)
Dept: OBSTETRICS AND GYNECOLOGY | Facility: CLINIC | Age: 19
End: 2024-04-30
Payer: COMMERCIAL

## 2024-04-30 VITALS
WEIGHT: 293 LBS | BODY MASS INDEX: 47.09 KG/M2 | SYSTOLIC BLOOD PRESSURE: 128 MMHG | DIASTOLIC BLOOD PRESSURE: 82 MMHG | HEIGHT: 66 IN

## 2024-04-30 DIAGNOSIS — N89.8 VAGINAL DISCHARGE: ICD-10-CM

## 2024-04-30 DIAGNOSIS — Z32.02 NEGATIVE PREGNANCY TEST: ICD-10-CM

## 2024-04-30 DIAGNOSIS — Z30.41 ORAL CONTRACEPTIVE PILL SURVEILLANCE: Primary | ICD-10-CM

## 2024-04-30 DIAGNOSIS — Z11.3 ROUTINE SCREENING FOR STI (SEXUALLY TRANSMITTED INFECTION): ICD-10-CM

## 2024-04-30 LAB
B-HCG UR QL: NEGATIVE
CTP QC/QA: YES
HBV SURFACE AG SERPL QL IA: NORMAL
HCV AB SERPL QL IA: NORMAL
HIV 1+2 AB+HIV1 P24 AG SERPL QL IA: NORMAL
TREPONEMA PALLIDUM IGG+IGM AB [PRESENCE] IN SERUM OR PLASMA BY IMMUNOASSAY: NONREACTIVE

## 2024-04-30 PROCEDURE — 87389 HIV-1 AG W/HIV-1&-2 AB AG IA: CPT | Performed by: NURSE PRACTITIONER

## 2024-04-30 PROCEDURE — 3008F BODY MASS INDEX DOCD: CPT | Mod: CPTII,S$GLB,, | Performed by: NURSE PRACTITIONER

## 2024-04-30 PROCEDURE — 99459 PELVIC EXAMINATION: CPT | Mod: S$GLB,,, | Performed by: NURSE PRACTITIONER

## 2024-04-30 PROCEDURE — 36415 COLL VENOUS BLD VENIPUNCTURE: CPT | Performed by: NURSE PRACTITIONER

## 2024-04-30 PROCEDURE — 3074F SYST BP LT 130 MM HG: CPT | Mod: CPTII,S$GLB,, | Performed by: NURSE PRACTITIONER

## 2024-04-30 PROCEDURE — 87491 CHLMYD TRACH DNA AMP PROBE: CPT | Performed by: NURSE PRACTITIONER

## 2024-04-30 PROCEDURE — 3079F DIAST BP 80-89 MM HG: CPT | Mod: CPTII,S$GLB,, | Performed by: NURSE PRACTITIONER

## 2024-04-30 PROCEDURE — 1159F MED LIST DOCD IN RCRD: CPT | Mod: CPTII,S$GLB,, | Performed by: NURSE PRACTITIONER

## 2024-04-30 PROCEDURE — 81025 URINE PREGNANCY TEST: CPT | Mod: S$GLB,,, | Performed by: NURSE PRACTITIONER

## 2024-04-30 PROCEDURE — 99999 PR PBB SHADOW E&M-EST. PATIENT-LVL III: CPT | Mod: PBBFAC,,, | Performed by: NURSE PRACTITIONER

## 2024-04-30 PROCEDURE — 81514 NFCT DS BV&VAGINITIS DNA ALG: CPT | Performed by: NURSE PRACTITIONER

## 2024-04-30 PROCEDURE — 87340 HEPATITIS B SURFACE AG IA: CPT | Performed by: NURSE PRACTITIONER

## 2024-04-30 PROCEDURE — 86593 SYPHILIS TEST NON-TREP QUANT: CPT | Performed by: NURSE PRACTITIONER

## 2024-04-30 PROCEDURE — 86803 HEPATITIS C AB TEST: CPT | Performed by: NURSE PRACTITIONER

## 2024-04-30 PROCEDURE — 99213 OFFICE O/P EST LOW 20 MIN: CPT | Mod: S$GLB,,, | Performed by: NURSE PRACTITIONER

## 2024-05-01 ENCOUNTER — TELEPHONE (OUTPATIENT)
Dept: OBSTETRICS AND GYNECOLOGY | Facility: CLINIC | Age: 19
End: 2024-05-01
Payer: COMMERCIAL

## 2024-05-01 DIAGNOSIS — A54.9 GONORRHEA: Primary | ICD-10-CM

## 2024-05-01 DIAGNOSIS — A74.9 CHLAMYDIA: ICD-10-CM

## 2024-05-01 DIAGNOSIS — N76.0 BACTERIAL VAGINOSIS: ICD-10-CM

## 2024-05-01 DIAGNOSIS — B96.89 BACTERIAL VAGINOSIS: ICD-10-CM

## 2024-05-01 LAB
BACTERIAL VAGINOSIS DNA: POSITIVE
C TRACH DNA SPEC QL NAA+PROBE: DETECTED
CANDIDA GLABRATA DNA: NEGATIVE
CANDIDA KRUSEI DNA: NEGATIVE
CANDIDA RRNA VAG QL PROBE: NEGATIVE
N GONORRHOEA DNA SPEC QL NAA+PROBE: DETECTED
T VAGINALIS RRNA GENITAL QL PROBE: NEGATIVE

## 2024-05-01 RX ORDER — METRONIDAZOLE 500 MG/1
500 TABLET ORAL EVERY 12 HOURS
Qty: 14 TABLET | Refills: 0 | Status: SHIPPED | OUTPATIENT
Start: 2024-05-01 | End: 2024-05-08

## 2024-05-01 RX ORDER — DOXYCYCLINE 100 MG/1
100 CAPSULE ORAL EVERY 12 HOURS
Qty: 14 CAPSULE | Refills: 0 | Status: SHIPPED | OUTPATIENT
Start: 2024-05-01 | End: 2024-05-08

## 2024-05-01 RX ORDER — CEFTRIAXONE 1 G/1
1 INJECTION, POWDER, FOR SOLUTION INTRAMUSCULAR; INTRAVENOUS ONCE
Qty: 1 G | Refills: 0 | Status: SHIPPED | OUTPATIENT
Start: 2024-05-01 | End: 2024-05-01

## 2024-05-01 RX ORDER — DIPHENHYDRAMINE HYDROCHLORIDE 50 MG/ML
50 INJECTION INTRAMUSCULAR; INTRAVENOUS
Status: COMPLETED | OUTPATIENT
Start: 2024-05-02 | End: 2024-05-02

## 2024-05-01 NOTE — TELEPHONE ENCOUNTER
Spoke with pt and scheduled injection appointment to be done on 5/2/2024 reminder to be sent to patient via my chart today. Pt verbalized understanding, no questions at this time

## 2024-05-01 NOTE — TELEPHONE ENCOUNTER
Spoke to patient. Informed her of GC/CT/BV infections.     CT - Doxycyline 100 mg BID x 7 days  GC - * For persons weighing ?150 kg, 1 g ceftriaxone should be administered    Allergy to penicillin when she was a baby. Unsure of exact reaction. Has not had exposure to PCN since this.   Discussed that per CDC - the risk for penicillin cross-reactivity is highest with first-generation cephalosporins but is rare (<1%) with third-generation cephalosporins (e.g., ceftriaxone and cefixime). Will proceed with Ceftriaxone injection after discussing R/B/As.     BV - Flagyl 500 mg BID x 7 days. Discussed avoiding alcohol during treatment.     Discussed recommendation to avoid sex until she has completed treatment.     She is no longer with her partner. Discussed recommendation for partner to be treated. She declines EPT at this time.     Discussed retest in 3 months.     All questions answered. Will route note to MA to assist patient in getting schedule at injection clinic for Ceftriaxone injection.

## 2024-05-02 ENCOUNTER — CLINICAL SUPPORT (OUTPATIENT)
Dept: OBSTETRICS AND GYNECOLOGY | Facility: CLINIC | Age: 19
End: 2024-05-02
Payer: COMMERCIAL

## 2024-05-02 ENCOUNTER — PATIENT MESSAGE (OUTPATIENT)
Dept: ADMINISTRATIVE | Facility: OTHER | Age: 19
End: 2024-05-02
Payer: COMMERCIAL

## 2024-05-02 DIAGNOSIS — A54.9 GONORRHEA: Primary | ICD-10-CM

## 2024-05-02 PROCEDURE — 96372 THER/PROPH/DIAG INJ SC/IM: CPT | Mod: S$GLB,,, | Performed by: NURSE PRACTITIONER

## 2024-05-02 PROCEDURE — 99999 PR PBB SHADOW E&M-EST. PATIENT-LVL I: CPT | Mod: PBBFAC,,,

## 2024-05-02 RX ORDER — CEFTRIAXONE 1 G/1
1 INJECTION, POWDER, FOR SOLUTION INTRAMUSCULAR; INTRAVENOUS
Status: COMPLETED | OUTPATIENT
Start: 2024-05-02 | End: 2024-05-02

## 2024-05-02 RX ADMIN — DIPHENHYDRAMINE HYDROCHLORIDE 50 MG: 50 INJECTION INTRAMUSCULAR; INTRAVENOUS at 08:05

## 2024-05-02 RX ADMIN — CEFTRIAXONE 1 G: 1 INJECTION, POWDER, FOR SOLUTION INTRAMUSCULAR; INTRAVENOUS at 09:05

## 2024-05-02 NOTE — PROGRESS NOTES
HERE FOR ROCEPHIN INJECTION 1G/ML PATIENT WITHOUT COMPLAINT AT THIS TIME. ALLERGIES REVIEWED, INJECTION GIVEN ADVISED TO WAIT IN LOBBY FOR 20 MINUTES POST INJECTION AND TO REPORT ANY ADVERSE REACTION. NO PAN NOTED PRIOR TO OR POST INJECTION .        SITE:     ORDER VERIFIED    PACKAGE INSPECTED, STORAGE VERIFIED AS WELL AS EXPIRATION VERIFIED BY NURSE       PATIENT SUPPLIED     SEE [employer] CARD FOR INFORMATION    8655-7604-03:NDC  [id number]:LOT  12/2025:EXP      Patient here for DEPHENHYDRAMINE injection. No pain noted, injection given. Patient tolerated well advised to wait in lobby 5 minutes and report any adverse reactions.       Site:

## 2024-06-05 ENCOUNTER — PATIENT MESSAGE (OUTPATIENT)
Dept: OBSTETRICS AND GYNECOLOGY | Facility: CLINIC | Age: 19
End: 2024-06-05
Payer: COMMERCIAL

## 2024-06-06 ENCOUNTER — OFFICE VISIT (OUTPATIENT)
Dept: OBSTETRICS AND GYNECOLOGY | Facility: CLINIC | Age: 19
End: 2024-06-06
Payer: COMMERCIAL

## 2024-06-06 ENCOUNTER — LAB VISIT (OUTPATIENT)
Dept: LAB | Facility: HOSPITAL | Age: 19
End: 2024-06-06
Payer: COMMERCIAL

## 2024-06-06 ENCOUNTER — TELEPHONE (OUTPATIENT)
Dept: OBSTETRICS AND GYNECOLOGY | Facility: CLINIC | Age: 19
End: 2024-06-06
Payer: COMMERCIAL

## 2024-06-06 VITALS
HEART RATE: 97 BPM | SYSTOLIC BLOOD PRESSURE: 106 MMHG | WEIGHT: 293 LBS | BODY MASS INDEX: 57.5 KG/M2 | DIASTOLIC BLOOD PRESSURE: 69 MMHG

## 2024-06-06 DIAGNOSIS — Z11.3 ROUTINE SCREENING FOR STI (SEXUALLY TRANSMITTED INFECTION): ICD-10-CM

## 2024-06-06 DIAGNOSIS — R30.0 DYSURIA: Primary | ICD-10-CM

## 2024-06-06 DIAGNOSIS — R30.0 DYSURIA: ICD-10-CM

## 2024-06-06 LAB
BILIRUB SERPL-MCNC: NORMAL MG/DL
BLOOD, POC UA: 250
GLUCOSE UR QL STRIP: NORMAL
HBV SURFACE AG SERPL QL IA: NORMAL
HCV AB SERPL QL IA: NORMAL
HIV 1+2 AB+HIV1 P24 AG SERPL QL IA: NORMAL
KETONES UR QL STRIP: NORMAL
LEUKOCYTE ESTERASE URINE, POC: NORMAL
NITRITE, POC UA: NORMAL
PH, POC UA: 7
PROTEIN, POC: NORMAL
SPECIFIC GRAVITY, POC UA: 1
TREPONEMA PALLIDUM IGG+IGM AB [PRESENCE] IN SERUM OR PLASMA BY IMMUNOASSAY: NONREACTIVE
UROBILINOGEN, POC UA: NORMAL

## 2024-06-06 PROCEDURE — 87491 CHLMYD TRACH DNA AMP PROBE: CPT | Performed by: NURSE PRACTITIONER

## 2024-06-06 PROCEDURE — 87389 HIV-1 AG W/HIV-1&-2 AB AG IA: CPT | Performed by: NURSE PRACTITIONER

## 2024-06-06 PROCEDURE — 87340 HEPATITIS B SURFACE AG IA: CPT | Performed by: NURSE PRACTITIONER

## 2024-06-06 PROCEDURE — 1159F MED LIST DOCD IN RCRD: CPT | Mod: CPTII,S$GLB,, | Performed by: NURSE PRACTITIONER

## 2024-06-06 PROCEDURE — 3008F BODY MASS INDEX DOCD: CPT | Mod: CPTII,S$GLB,, | Performed by: NURSE PRACTITIONER

## 2024-06-06 PROCEDURE — 86803 HEPATITIS C AB TEST: CPT | Performed by: NURSE PRACTITIONER

## 2024-06-06 PROCEDURE — 99999 PR PBB SHADOW E&M-EST. PATIENT-LVL III: CPT | Mod: PBBFAC,,, | Performed by: NURSE PRACTITIONER

## 2024-06-06 PROCEDURE — 87591 N.GONORRHOEAE DNA AMP PROB: CPT | Performed by: NURSE PRACTITIONER

## 2024-06-06 PROCEDURE — 86593 SYPHILIS TEST NON-TREP QUANT: CPT | Performed by: NURSE PRACTITIONER

## 2024-06-06 PROCEDURE — 3078F DIAST BP <80 MM HG: CPT | Mod: CPTII,S$GLB,, | Performed by: NURSE PRACTITIONER

## 2024-06-06 PROCEDURE — 3074F SYST BP LT 130 MM HG: CPT | Mod: CPTII,S$GLB,, | Performed by: NURSE PRACTITIONER

## 2024-06-06 PROCEDURE — 36415 COLL VENOUS BLD VENIPUNCTURE: CPT | Performed by: NURSE PRACTITIONER

## 2024-06-06 PROCEDURE — 99214 OFFICE O/P EST MOD 30 MIN: CPT | Mod: S$GLB,,, | Performed by: NURSE PRACTITIONER

## 2024-06-06 PROCEDURE — 81003 URINALYSIS AUTO W/O SCOPE: CPT | Mod: QW,S$GLB,, | Performed by: NURSE PRACTITIONER

## 2024-06-06 PROCEDURE — 87086 URINE CULTURE/COLONY COUNT: CPT | Performed by: NURSE PRACTITIONER

## 2024-06-06 RX ORDER — NITROFURANTOIN 25; 75 MG/1; MG/1
100 CAPSULE ORAL 2 TIMES DAILY
Qty: 10 CAPSULE | Refills: 0 | Status: SHIPPED | OUTPATIENT
Start: 2024-06-06 | End: 2024-06-11

## 2024-06-06 NOTE — PROGRESS NOTES
Chief Complaint: UTI concerns      HPI:      Alec is a 18 y.o.  who presents today for UTI concerns.  Ongoing x 2 days, urinary urgency and frequency. She is noticing stinging towards the end of urinating.  She also noticed blood in her urine; not currently on her menses. She denies any back pain, fever/body aches/chills. She has noticed pelvic pressure.     Denies any vaginal concerns.     Alec  is currently sexually active . She is currently using oral contraceptives (estrogen/progesterone) for contraception. Desires STI screening     Gardasil:Completed     Physical Exam:      PHYSICAL EXAM:  /69   Pulse 97   Wt (!) 161.6 kg (356 lb 4.2 oz)   LMP 2024   BMI 57.50 kg/m²   Body mass index is 57.5 kg/m².     APPEARANCE: Well nourished, well developed, in no acute distress.    Assessment/Plan:     Dysuria  -     POCT URINALYSIS  -     nitrofurantoin, macrocrystal-monohydrate, (MACROBID) 100 MG capsule; Take 1 capsule (100 mg total) by mouth 2 (two) times daily. for 5 days  Dispense: 10 capsule; Refill: 0  -     CULTURE, URINE  -     C. trachomatis/N. gonorrhoeae by AMP DNA [facility]; Vagina  -     Hepatitis B Surface Antigen; Future; Expected date: 2024  -     Hepatitis C Antibody; Future; Expected date: 2024  -     HIV 1/2 Ag/Ab (4th Gen); Future; Expected date: 2024  -     Treponema Pallidium Antibodies IgG, IgM; Future; Expected date: 2024    Routine screening for STI (sexually transmitted infection)  -     nitrofurantoin, macrocrystal-monohydrate, (MACROBID) 100 MG capsule; Take 1 capsule (100 mg total) by mouth 2 (two) times daily. for 5 days  Dispense: 10 capsule; Refill: 0  -     CULTURE, URINE  -     C. trachomatis/N. gonorrhoeae by AMP DNA [facility]; Vagina  -     Hepatitis B Surface Antigen; Future; Expected date: 2024  -     Hepatitis C Antibody; Future; Expected date: 2024  -     HIV 1/2 Ag/Ab (4th Gen); Future; Expected date: 2024  -      Treponema Pallidium Antibodies IgG, IgM; Future; Expected date: 06/06/2024      PLAN:    UA pertinent for trace leuks/protein, +RBCs. Will send for urine culture.   Will treat empirically for UTI with Macrobid 100 mg BID x 5 days.   GC/CT self-collected. Serum STI screening ordered.     Follow up if symptoms worsen or fail to improve.

## 2024-06-06 NOTE — TELEPHONE ENCOUNTER
Called pt regarding a portal message she sent requesting an appointment today in concerns of a possible uti. Pt was offered an appt for this afternoon. Pt agreed and Pt v/u

## 2024-06-07 LAB
BACTERIA UR CULT: NORMAL
BACTERIA UR CULT: NORMAL

## 2024-06-10 ENCOUNTER — LAB VISIT (OUTPATIENT)
Dept: LAB | Facility: HOSPITAL | Age: 19
End: 2024-06-10
Payer: COMMERCIAL

## 2024-06-10 DIAGNOSIS — R31.9 HEMATURIA, UNSPECIFIED TYPE: ICD-10-CM

## 2024-06-10 DIAGNOSIS — R31.9 HEMATURIA, UNSPECIFIED TYPE: Primary | ICD-10-CM

## 2024-06-10 LAB
BACTERIA #/AREA URNS HPF: ABNORMAL /HPF
MICROSCOPIC COMMENT: ABNORMAL
RBC #/AREA URNS HPF: 8 /HPF (ref 0–4)
SQUAMOUS #/AREA URNS HPF: 18 /HPF
WBC #/AREA URNS HPF: >100 /HPF (ref 0–5)
WBC CLUMPS URNS QL MICRO: ABNORMAL

## 2024-06-10 PROCEDURE — 81000 URINALYSIS NONAUTO W/SCOPE: CPT | Performed by: NURSE PRACTITIONER

## 2024-06-11 ENCOUNTER — TELEPHONE (OUTPATIENT)
Dept: OBSTETRICS AND GYNECOLOGY | Facility: CLINIC | Age: 19
End: 2024-06-11
Payer: COMMERCIAL

## 2024-06-11 DIAGNOSIS — R31.9 HEMATURIA, UNSPECIFIED TYPE: ICD-10-CM

## 2024-06-11 DIAGNOSIS — R30.0 DYSURIA: ICD-10-CM

## 2024-06-11 DIAGNOSIS — A54.9 GONORRHEA: Primary | ICD-10-CM

## 2024-06-11 LAB
C TRACH DNA SPEC QL NAA+PROBE: NOT DETECTED
N GONORRHOEA DNA SPEC QL NAA+PROBE: DETECTED

## 2024-06-11 RX ORDER — DIPHENHYDRAMINE HYDROCHLORIDE 50 MG/ML
50 INJECTION INTRAMUSCULAR; INTRAVENOUS
Status: COMPLETED | OUTPATIENT
Start: 2024-06-11 | End: 2024-06-12

## 2024-06-11 RX ORDER — CEFTRIAXONE 1 G/1
1 INJECTION, POWDER, FOR SOLUTION INTRAMUSCULAR; INTRAVENOUS ONCE
Qty: 1 G | Refills: 0 | Status: SHIPPED | OUTPATIENT
Start: 2024-06-11 | End: 2024-06-11

## 2024-06-11 NOTE — TELEPHONE ENCOUNTER
Call to patient. Discussed GC+. History of GC on 4/30. Has treatment for this on 5/2.   She reports a new partner since being treated.   Suspect new infection seeing that she was treated appropriately, had a new partner and retest was >4 weeks since treatment.     She reports using condoms sometimes.     She is interested in EPT and will send a message with partner's information.     She is also interested in discussing Doxy PEP and HIV PrEP. Virtual visit scheduled to discuss this further.     Discussed patient should abstain from sex for 7 days after treatment, until partner/s are treated and until resolution in symptoms.     Will cancel Urology appt for today seeing that urinary symptoms very likely related to gonorrhea infection.     GC - * For persons weighing ?150 kg, 1 g ceftriaxone should be administered     Allergy to penicillin when she was a baby. Unsure of exact reaction. Has not had exposure to PCN since this.   Discussed that per CDC - the risk for penicillin cross-reactivity is highest with first-generation cephalosporins but is rare (<1%) with third-generation cephalosporins (e.g., ceftriaxone and cefixime). Will proceed with Ceftriaxone injection after discussing R/B/As.     Benadryl injection to be given prior.

## 2024-06-12 ENCOUNTER — CLINICAL SUPPORT (OUTPATIENT)
Dept: OBSTETRICS AND GYNECOLOGY | Facility: CLINIC | Age: 19
End: 2024-06-12
Payer: COMMERCIAL

## 2024-06-12 DIAGNOSIS — A54.9 GONORRHEA: Primary | ICD-10-CM

## 2024-06-12 PROCEDURE — 96372 THER/PROPH/DIAG INJ SC/IM: CPT | Mod: S$GLB,,, | Performed by: NURSE PRACTITIONER

## 2024-06-12 PROCEDURE — 99999 PR PBB SHADOW E&M-EST. PATIENT-LVL I: CPT | Mod: PBBFAC,,,

## 2024-06-12 RX ORDER — CEFTRIAXONE 1 G/1
1 INJECTION, POWDER, FOR SOLUTION INTRAMUSCULAR; INTRAVENOUS
Status: COMPLETED | OUTPATIENT
Start: 2024-06-12 | End: 2024-06-12

## 2024-06-12 RX ADMIN — DIPHENHYDRAMINE HYDROCHLORIDE 50 MG: 50 INJECTION INTRAMUSCULAR; INTRAVENOUS at 01:06

## 2024-06-12 RX ADMIN — CEFTRIAXONE 1 G: 1 INJECTION, POWDER, FOR SOLUTION INTRAMUSCULAR; INTRAVENOUS at 01:06

## 2024-06-12 NOTE — PROGRESS NOTES
Patient here for diphenhydramine 50mg/ml injection. No pain noted, injection given. Patient tolerated well advised to wait in lobby 5 minutes and report any adverse reactions.       Site: ld      HERE FOR ROCEPHIN INJECTION 1 G/ML PATIENT WITHOUT COMPLAINT AT THIS TIME. ALLERGIES REVIEWED, INJECTION GIVEN ADVISED TO WAIT IN LOBBY FOR 20 MINUTES POST INJECTION AND TO REPORT ANY ADVERSE REACTION. NO PAN NOTED PRIOR TO OR POST INJECTION .        SITE: lb    ORDER VERIFIED    PACKAGE INSPECTED, STORAGE VERIFIED AS WELL AS EXPIRATION VERIFIED BY NURSE       PATIENT SUPPLIED  NDC:2340-8626-29  LOT:[id number]  EXP:12/2025    SEE MED CARD FOR INFORMATION

## 2024-06-13 ENCOUNTER — OFFICE VISIT (OUTPATIENT)
Dept: OBSTETRICS AND GYNECOLOGY | Facility: CLINIC | Age: 19
End: 2024-06-13
Payer: COMMERCIAL

## 2024-06-13 ENCOUNTER — OFFICE VISIT (OUTPATIENT)
Dept: DERMATOLOGY | Facility: CLINIC | Age: 19
End: 2024-06-13
Payer: COMMERCIAL

## 2024-06-13 DIAGNOSIS — Z86.19 HISTORY OF CHLAMYDIA: Primary | ICD-10-CM

## 2024-06-13 DIAGNOSIS — L68.0 HIRSUTISM: ICD-10-CM

## 2024-06-13 DIAGNOSIS — R31.9 HEMATURIA, UNSPECIFIED TYPE: ICD-10-CM

## 2024-06-13 DIAGNOSIS — R30.0 DYSURIA: ICD-10-CM

## 2024-06-13 DIAGNOSIS — Z86.19 HISTORY OF GONORRHEA: ICD-10-CM

## 2024-06-13 DIAGNOSIS — L70.0 ACNE VULGARIS: Primary | ICD-10-CM

## 2024-06-13 PROCEDURE — 1160F RVW MEDS BY RX/DR IN RCRD: CPT | Mod: CPTII,S$GLB,, | Performed by: STUDENT IN AN ORGANIZED HEALTH CARE EDUCATION/TRAINING PROGRAM

## 2024-06-13 PROCEDURE — 99999 PR PBB SHADOW E&M-EST. PATIENT-LVL III: CPT | Mod: PBBFAC,,, | Performed by: STUDENT IN AN ORGANIZED HEALTH CARE EDUCATION/TRAINING PROGRAM

## 2024-06-13 PROCEDURE — 1159F MED LIST DOCD IN RCRD: CPT | Mod: CPTII,S$GLB,, | Performed by: STUDENT IN AN ORGANIZED HEALTH CARE EDUCATION/TRAINING PROGRAM

## 2024-06-13 PROCEDURE — 99204 OFFICE O/P NEW MOD 45 MIN: CPT | Mod: S$GLB,,, | Performed by: STUDENT IN AN ORGANIZED HEALTH CARE EDUCATION/TRAINING PROGRAM

## 2024-06-13 PROCEDURE — G2211 COMPLEX E/M VISIT ADD ON: HCPCS | Mod: S$GLB,,, | Performed by: STUDENT IN AN ORGANIZED HEALTH CARE EDUCATION/TRAINING PROGRAM

## 2024-06-13 RX ORDER — CLINDAMYCIN PHOSPHATE 11.9 MG/ML
SOLUTION TOPICAL DAILY
Qty: 60 ML | Refills: 3 | Status: SHIPPED | OUTPATIENT
Start: 2024-06-13

## 2024-06-13 RX ORDER — TRETINOIN 0.25 MG/G
CREAM TOPICAL NIGHTLY
Qty: 45 G | Refills: 3 | Status: SHIPPED | OUTPATIENT
Start: 2024-06-13

## 2024-06-13 NOTE — PROGRESS NOTES
Chief Complaint: GC F/u     The patient location is: Car (not driving)  The chief complaint leading to consultation is: GC F/u, Discussed HIV PrEP    Visit type: audiovisual    Face to Face time with patient: 15  20 minutes of total time spent on the encounter, which includes face to face time and non-face to face time preparing to see the patient (eg, review of tests), Obtaining and/or reviewing separately obtained history, Documenting clinical information in the electronic or other health record, Independently interpreting results (not separately reported) and communicating results to the patient/family/caregiver, or Care coordination (not separately reported).     Each patient to whom he or she provides medical services by telemedicine is:  (1) informed of the relationship between the physician and patient and the respective role of any other health care provider with respect to management of the patient; and (2) notified that he or she may decline to receive medical services by telemedicine and may withdraw from such care at any time.    HPI:      Alec is a 18 y.o.  who presents today for gonorrhea follow-up and to discuss HIV PrEP.      She was previously seen due to UTI concerns. Urine culture negative for UTI. She took 3 doses of Macrobid and then stopped.   Hx of GC ( and ) - new infection suspected  Hx of CT ()    She had Ceftriaxone injection yesterday. Recent sexual partner is being seen by a provider today to be treated.     Menses are regular w/ OCPs. Patient's last menstrual period was 2024.     Alec  is currently sexually active . She is currently using oral contraceptives (estrogen/progesterone) for contraception. Uses condoms sometimes.     Gardasil:Completed     Physical Exam:      PHYSICAL EXAM:  LMP 2024   There is no height or weight on file to calculate BMI.     APPEARANCE: Well nourished, well developed, in no acute distress.      Assessment/Plan:      History of chlamydia    History of gonorrhea    Dysuria    Hematuria, unspecified type    PLAN:    Discussed PrEP options with patient - TDF/FTC (Truvada) PO daily and Cabotegravir (Apretude) IM on day 0, at 4 weeks, and then every 8 weeks  Counseled pt that when taken correctly, oral PrEP reduces the likelihood of sexual acquisition by 99%, Long-acting injectable cabotegravir also highly efficacious in preventing sexual acquisition of HIV.  Counseled on side effects with Truvada including including nausea, loss of appetite, bloating, diarrhea as well as rare risk for kidney injury/nephrotoxicity as well as side effects with Apretude including injection site pain as well as rare risk of hypersensitivity   Discussed specific labs recommended and frequency while on PrEP   Discussed that per CDC, doxycycline postexposure prophylaxis (doxy PEP) for the prevention of bacterial sexually transmitted infections (STIs) (specifically syphilis, chlamydia, and gonorrhea) is currently only recommended for MSM (men who have sex with men) and TGW (transgender women)  Discussed that I suspect her urinary symptoms are related to gonorrhea infection seeing that urine culture was negative, but reasonable for her to finish Macrobid prescription  Gonorrhea retest in 3 months recommended or STI testing sooner with any new exposures.   Discussed importance of condom use to prevent STI exposure.     Follow up in about 3 months (around 9/13/2024).

## 2024-06-13 NOTE — PROGRESS NOTES
Subjective:       Patient ID:  Alec Araya is a 18 y.o. female who presents for No chief complaint on file.    History of Present Illness: The patient presents with chief complaint of acne breakouts and unwanted facial hair.  Location: acne on the face, and facial hair on the chin  Duration: ongoing for years  Signs/Symptoms: reports whenever she gets stressed, developing breakouts of white heads and dark marks. Has thick hairs on the chin that she has to pluck/shave.   Prior treatments: has only tried OTC washes.           Review of Systems   Constitutional:  Negative for fever and chills.   Skin:  Negative for itching, rash and dry skin.        Objective:    Physical Exam   Constitutional: She appears well-developed and well-nourished. No distress.   Neurological: She is alert and oriented to person, place, and time. She is not disoriented.   Psychiatric: She has a normal mood and affect.   Skin:   Areas Examined (abnormalities noted in diagram):   Head / Face Inspection Performed  Neck Inspection Performed              Diagram Legend     Erythematous scaling macule/papule c/w actinic keratosis       Vascular papule c/w angioma      Pigmented verrucoid papule/plaque c/w seborrheic keratosis      Yellow umbilicated papule c/w sebaceous hyperplasia      Irregularly shaped tan macule c/w lentigo     1-2 mm smooth white papules consistent with Milia      Movable subcutaneous cyst with punctum c/w epidermal inclusion cyst      Subcutaneous movable cyst c/w pilar cyst      Firm pink to brown papule c/w dermatofibroma      Pedunculated fleshy papule(s) c/w skin tag(s)      Evenly pigmented macule c/w junctional nevus     Mildly variegated pigmented, slightly irregular-bordered macule c/w mildly atypical nevus      Flesh colored to evenly pigmented papule c/w intradermal nevus       Pink pearly papule/plaque c/w basal cell carcinoma      Erythematous hyperkeratotic cursted plaque c/w SCC      Surgical scar  with no sign of skin cancer recurrence      Open and closed comedones      Inflammatory papules and pustules      Verrucoid papule consistent consistent with wart     Erythematous eczematous patches and plaques     Dystrophic onycholytic nail with subungual debris c/w onychomycosis     Umbilicated papule    Erythematous-base heme-crusted tan verrucoid plaque consistent with inflamed seborrheic keratosis     Erythematous Silvery Scaling Plaque c/w Psoriasis     See annotation      Assessment / Plan:        Acne vulgaris  -     tretinoin (RETIN-A) 0.025 % cream; Apply topically every evening.  Dispense: 45 g; Refill: 3  -     clindamycin (CLEOCIN T) 1 % external solution; Apply topically once daily.  Dispense: 60 mL; Refill: 3    Hirsutism - discussed options such as laser hair removal.              Follow up in about 6 months (around 12/13/2024).

## 2024-06-13 NOTE — PATIENT INSTRUCTIONS

## 2024-06-17 ENCOUNTER — OFFICE VISIT (OUTPATIENT)
Dept: OBSTETRICS AND GYNECOLOGY | Facility: CLINIC | Age: 19
End: 2024-06-17
Payer: COMMERCIAL

## 2024-06-17 VITALS
WEIGHT: 293 LBS | DIASTOLIC BLOOD PRESSURE: 82 MMHG | HEIGHT: 66 IN | BODY MASS INDEX: 47.09 KG/M2 | SYSTOLIC BLOOD PRESSURE: 120 MMHG

## 2024-06-17 DIAGNOSIS — R39.89 GENITAL SORE: Primary | ICD-10-CM

## 2024-06-17 DIAGNOSIS — Z91.89 AT HIGH RISK FOR EXPOSURE TO HIV: ICD-10-CM

## 2024-06-17 PROCEDURE — 99213 OFFICE O/P EST LOW 20 MIN: CPT | Mod: S$GLB,,, | Performed by: NURSE PRACTITIONER

## 2024-06-17 PROCEDURE — 1160F RVW MEDS BY RX/DR IN RCRD: CPT | Mod: CPTII,S$GLB,, | Performed by: NURSE PRACTITIONER

## 2024-06-17 PROCEDURE — 3008F BODY MASS INDEX DOCD: CPT | Mod: CPTII,S$GLB,, | Performed by: NURSE PRACTITIONER

## 2024-06-17 PROCEDURE — 3074F SYST BP LT 130 MM HG: CPT | Mod: CPTII,S$GLB,, | Performed by: NURSE PRACTITIONER

## 2024-06-17 PROCEDURE — 87529 HSV DNA AMP PROBE: CPT | Performed by: NURSE PRACTITIONER

## 2024-06-17 PROCEDURE — 99999 PR PBB SHADOW E&M-EST. PATIENT-LVL III: CPT | Mod: PBBFAC,,, | Performed by: NURSE PRACTITIONER

## 2024-06-17 PROCEDURE — 1159F MED LIST DOCD IN RCRD: CPT | Mod: CPTII,S$GLB,, | Performed by: NURSE PRACTITIONER

## 2024-06-17 PROCEDURE — 3079F DIAST BP 80-89 MM HG: CPT | Mod: CPTII,S$GLB,, | Performed by: NURSE PRACTITIONER

## 2024-06-17 RX ORDER — ACYCLOVIR 400 MG/1
400 TABLET ORAL 2 TIMES DAILY
Qty: 60 TABLET | Refills: 11 | Status: SHIPPED | OUTPATIENT
Start: 2024-06-17 | End: 2025-06-17

## 2024-06-17 RX ORDER — VALACYCLOVIR HYDROCHLORIDE 1 G/1
1000 TABLET, FILM COATED ORAL DAILY
Qty: 30 TABLET | Refills: 11 | Status: SHIPPED | OUTPATIENT
Start: 2024-06-17 | End: 2024-06-17

## 2024-06-17 RX ORDER — ACYCLOVIR 400 MG/1
400 TABLET ORAL 3 TIMES DAILY
Qty: 30 TABLET | Refills: 0 | Status: SHIPPED | OUTPATIENT
Start: 2024-06-17 | End: 2024-06-27

## 2024-06-17 RX ORDER — VALACYCLOVIR HYDROCHLORIDE 1 G/1
1000 TABLET, FILM COATED ORAL 2 TIMES DAILY
Qty: 20 TABLET | Refills: 0 | Status: SHIPPED | OUTPATIENT
Start: 2024-06-17 | End: 2024-06-17

## 2024-06-17 RX ORDER — VALACYCLOVIR HYDROCHLORIDE 1 G/1
1000 TABLET, FILM COATED ORAL 2 TIMES DAILY
Qty: 60 TABLET | Refills: 11 | Status: CANCELLED | OUTPATIENT
Start: 2024-06-17 | End: 2025-06-17

## 2024-06-17 NOTE — PROGRESS NOTES
"Chief Complaint: Urinary burning/vaginal sores     HPI:      Alec is a 18 y.o.  who presents today due to pain/burning with urination as well as noticing sores on her vulva.    She noticed this yesterday and symptoms are persistent.     She recently had Ceftriaxone injection due to gonorrhea infection. Her recent partner is currently seeing a provider to get treatment.    She also was recently treated with Macrobid for suspected UTI however urine culture was negative.     She is interested in PrEP - prefers injection due to concern for GI issues with oral medication.    Physical Exam:      PHYSICAL EXAM:  /82   Ht 5' 6" (1.676 m)   Wt (!) 159.9 kg (352 lb 8.3 oz)   LMP 2024   BMI 56.90 kg/m²   Body mass index is 56.9 kg/m².     APPEARANCE: Well nourished, well developed, in no acute distress.  PELVIC:  Diffuse ulcerations noted on bilateral aspects of labia majora and minora and perineum.    Assessment/Plan:     Genital sore  -     HSV by Rapid PCR, Non-Blood [facility]; Vagina (vulva)  -     acyclovir (ZOVIRAX) 400 MG tablet; Take 1 tablet (400 mg total) by mouth 3 (three) times daily. for 10 days  Dispense: 30 tablet; Refill: 0  -     acyclovir (ZOVIRAX) 400 MG tablet; Take 1 tablet (400 mg total) by mouth 2 (two) times daily.  Dispense: 60 tablet; Refill: 11    At high risk for exposure to HIV  -     Ambulatory referral/consult to Infectious Disease; Future; Expected date: 2024    Other orders  -     Discontinue: valACYclovir (VALTREX) 1000 MG tablet; Take 1 tablet (1,000 mg total) by mouth 2 (two) times daily. for 10 days  Dispense: 20 tablet; Refill: 0  -     Discontinue: valACYclovir (VALTREX) 1000 MG tablet; Take 1 tablet (1,000 mg total) by mouth once daily.  Dispense: 30 tablet; Refill: 11    PLAN:    Discussed strong suspicion for genital HSV outbreak based on exam findings. HSV culture obtained to confirm diagnosis.  Will treat with Acyclovir 400 mg TID x 10 days. Alec also " does desire suppressive therapy - prescription sent for Acyclovir 400 mg BID daily.   I explained the chronic and incurable nature of herpes virus to the patient today.   We discussed that outbreak recurrence, which is generally benign, is treatable with medications.   I explained to the patient that though transmission is more likely during acute outbreaks and the prodromal phase, it is still possible to shed the virus and thus spread the disease when she is asymptomatic.   I recommended avoidance of intercourse during symptomatic periods, and condom use even during asymptomatic periods to decrease the chance of partner infection.   Referral to ID to discuss PrEP - Alec is interested in IM PrEP - concern about GI side effects with oral option.    Follow up if symptoms worsen or fail to improve.

## 2024-06-18 LAB — SPECIMEN SOURCE: NORMAL

## 2024-06-21 ENCOUNTER — PATIENT MESSAGE (OUTPATIENT)
Dept: OBSTETRICS AND GYNECOLOGY | Facility: CLINIC | Age: 19
End: 2024-06-21
Payer: COMMERCIAL

## 2024-06-21 ENCOUNTER — TELEPHONE (OUTPATIENT)
Dept: OBSTETRICS AND GYNECOLOGY | Facility: CLINIC | Age: 19
End: 2024-06-21
Payer: COMMERCIAL

## 2024-06-21 LAB
HSV1 DNA SPEC QL NAA+PROBE: NEGATIVE
HSV2 DNA SPEC QL NAA+PROBE: POSITIVE
SPECIMEN SOURCE: ABNORMAL

## 2024-06-21 NOTE — TELEPHONE ENCOUNTER
Spoke with pt. Discussed positive result. Discussed initial result was negative. Addendum was made correcting the results.   All questions answered.   Will do suppressive therapy.     ----- Message from Alice Marie sent at 6/21/2024  1:21 PM CDT -----  Regarding: missed call       Who Called: Alec         Who Left Message for Patient: Daniela         Does the patient know what this is regarding? Yes         Best Call Back Number: [phone]         Additional Information: Please call number over

## 2024-07-12 ENCOUNTER — PATIENT MESSAGE (OUTPATIENT)
Dept: OBSTETRICS AND GYNECOLOGY | Facility: CLINIC | Age: 19
End: 2024-07-12
Payer: COMMERCIAL

## 2024-07-23 ENCOUNTER — OFFICE VISIT (OUTPATIENT)
Dept: OBSTETRICS AND GYNECOLOGY | Facility: CLINIC | Age: 19
End: 2024-07-23
Payer: COMMERCIAL

## 2024-07-23 VITALS
DIASTOLIC BLOOD PRESSURE: 75 MMHG | SYSTOLIC BLOOD PRESSURE: 114 MMHG | HEART RATE: 87 BPM | HEIGHT: 66 IN | BODY MASS INDEX: 47.09 KG/M2 | WEIGHT: 293 LBS

## 2024-07-23 DIAGNOSIS — N76.0 BACTERIAL VAGINOSIS: ICD-10-CM

## 2024-07-23 DIAGNOSIS — N92.1 BREAKTHROUGH BLEEDING ON OCPS: Primary | ICD-10-CM

## 2024-07-23 DIAGNOSIS — Z11.3 ROUTINE SCREENING FOR STI (SEXUALLY TRANSMITTED INFECTION): ICD-10-CM

## 2024-07-23 DIAGNOSIS — Z30.41 ORAL CONTRACEPTIVE PILL SURVEILLANCE: ICD-10-CM

## 2024-07-23 DIAGNOSIS — B96.89 BACTERIAL VAGINOSIS: ICD-10-CM

## 2024-07-23 LAB
B-HCG UR QL: NEGATIVE
CTP QC/QA: YES

## 2024-07-23 PROCEDURE — 1159F MED LIST DOCD IN RCRD: CPT | Mod: CPTII,S$GLB,, | Performed by: NURSE PRACTITIONER

## 2024-07-23 PROCEDURE — 87491 CHLMYD TRACH DNA AMP PROBE: CPT | Performed by: NURSE PRACTITIONER

## 2024-07-23 PROCEDURE — 99213 OFFICE O/P EST LOW 20 MIN: CPT | Mod: S$GLB,,, | Performed by: NURSE PRACTITIONER

## 2024-07-23 PROCEDURE — 81025 URINE PREGNANCY TEST: CPT | Mod: S$GLB,,, | Performed by: NURSE PRACTITIONER

## 2024-07-23 PROCEDURE — 3074F SYST BP LT 130 MM HG: CPT | Mod: CPTII,S$GLB,, | Performed by: NURSE PRACTITIONER

## 2024-07-23 PROCEDURE — 3078F DIAST BP <80 MM HG: CPT | Mod: CPTII,S$GLB,, | Performed by: NURSE PRACTITIONER

## 2024-07-23 PROCEDURE — 99999 PR PBB SHADOW E&M-EST. PATIENT-LVL III: CPT | Mod: PBBFAC,,, | Performed by: NURSE PRACTITIONER

## 2024-07-23 PROCEDURE — 99459 PELVIC EXAMINATION: CPT | Mod: S$GLB,,, | Performed by: NURSE PRACTITIONER

## 2024-07-23 PROCEDURE — 87591 N.GONORRHOEAE DNA AMP PROB: CPT | Performed by: NURSE PRACTITIONER

## 2024-07-23 PROCEDURE — 81514 NFCT DS BV&VAGINITIS DNA ALG: CPT | Performed by: NURSE PRACTITIONER

## 2024-07-23 PROCEDURE — 3044F HG A1C LEVEL LT 7.0%: CPT | Mod: CPTII,S$GLB,, | Performed by: NURSE PRACTITIONER

## 2024-07-23 PROCEDURE — 3008F BODY MASS INDEX DOCD: CPT | Mod: CPTII,S$GLB,, | Performed by: NURSE PRACTITIONER

## 2024-07-23 RX ORDER — METRONIDAZOLE 500 MG/1
500 TABLET ORAL 2 TIMES DAILY
COMMUNITY
Start: 2024-07-17 | End: 2024-07-23

## 2024-07-23 RX ORDER — METRONIDAZOLE 7.5 MG/G
1 GEL VAGINAL NIGHTLY
Qty: 5 APPLICATOR | Refills: 0 | Status: SHIPPED | OUTPATIENT
Start: 2024-07-23 | End: 2024-07-28

## 2024-07-23 RX ORDER — METFORMIN HYDROCHLORIDE 500 MG/1
1000 TABLET, EXTENDED RELEASE ORAL
COMMUNITY
Start: 2024-07-06

## 2024-07-23 RX ORDER — DROSPIRENONE AND ETHINYL ESTRADIOL 0.03MG-3MG
1 KIT ORAL DAILY
Qty: 28 TABLET | Refills: 12 | Status: SHIPPED | OUTPATIENT
Start: 2024-07-23 | End: 2025-07-23

## 2024-07-23 NOTE — PROGRESS NOTES
Chief Complaint: BTB     HPI:      Alec is a 18 y.o.  who presents today for BTB with OCPs.     She has been on drospirenone-ethinyl estradioL (ARETHA) 3-0.02 mg per tablet x 3-4 months. She noticed bleeding throughout the packet.   Most recently - she had bleeding from -. Has now stopped. Bleeding was heavy at times with clots.       She did recently have STI screening with PCP in 2024. Tested positive for BV. She could not tolerate taking the Flagyl pills. She does desire screening today.   She has appt with ID tomorrow to discuss HIV PrEP.    Alec  is currently sexually active . She is currently using oral contraceptives (estrogen/progesterone) for contraception.      Gardasil:Completed     Past Medical History:   Diagnosis Date    ADHD        Current Outpatient Medications:     metFORMIN (GLUCOPHAGE-XR) 500 MG ER 24hr tablet, Take 1,000 mg by mouth., Disp: , Rfl:     tretinoin (RETIN-A) 0.025 % cream, Apply topically every evening., Disp: 45 g, Rfl: 3    acyclovir (ZOVIRAX) 400 MG tablet, Take 1 tablet (400 mg total) by mouth 2 (two) times daily. (Patient not taking: Reported on 2024), Disp: 60 tablet, Rfl: 11    ARIPiprazole (ABILIFY) 10 MG Tab, Take 10 mg by mouth., Disp: , Rfl:     clindamycin (CLEOCIN T) 1 % external solution, Apply topically once daily. (Patient not taking: Reported on 2024), Disp: 60 mL, Rfl: 3    drospirenone-ethinyl estradioL (TYLER, 28,) 3-0.03 mg per tablet, Take 1 tablet by mouth once daily., Disp: 28 tablet, Rfl: 12    FLUoxetine 20 MG capsule, Take 20 mg by mouth., Disp: , Rfl:     metroNIDAZOLE (METROGEL) 0.75 % (37.5mg/5 gram) vaginal gel, Place 1 applicator vaginally every evening. for 5 days, Disp: 5 applicator, Rfl: 0    polyethylene glycol (GLYCOLAX) 17 gram/dose powder, Take 17 g by mouth. (Patient not taking: Reported on 2024), Disp: , Rfl:    Review of patient's allergies indicates:   Allergen Reactions    Penicillins      History  "reviewed. No pertinent surgical history.  Social History     Tobacco Use    Smoking status: Never    Smokeless tobacco: Never   Substance Use Topics    Alcohol use: Never    Drug use: Yes     Types: Marijuana     Family History   Problem Relation Name Age of Onset    Hypertension Paternal Grandmother      Diabetes Maternal Grandmother      Hypertension Maternal Grandmother      Hyperlipidemia Maternal Grandfather      Prostate cancer Maternal Grandfather      Hypertension Father      Eczema Mother      Asthma Mother         Physical Exam:      PHYSICAL EXAM:  /75   Pulse 87   Ht 5' 6" (1.676 m)   Wt (!) 161 kg (354 lb 15.1 oz)   BMI 57.29 kg/m²   Body mass index is 57.29 kg/m².     APPEARANCE: Well nourished, well developed, in no acute distress.  PELVIC:  External genitalia and urethra within normal limits. Vagina without lesions, without discharge, without erythema, without ulcers.  Cervix without cervical motion tenderness, non-friable. Uterus: normal size, mobile, non-tender.  No adnexal masses or tenderness palpated.    Chaperoned by: SHERRY Edmond MA    Assessment/Plan:     Breakthrough bleeding on OCPs  -     POCT Urine Pregnancy  -     C. trachomatis/N. gonorrhoeae by AMP DNA [facility]; Cervicovaginal  -     Vaginosis Screen by DNA Probe  -     drospirenone-ethinyl estradioL (TYLER, 28,) 3-0.03 mg per tablet; Take 1 tablet by mouth once daily.  Dispense: 28 tablet; Refill: 12    Routine screening for STI (sexually transmitted infection)  -     C. trachomatis/N. gonorrhoeae by AMP DNA [facility]; Cervicovaginal  -     Vaginosis Screen by DNA Probe    Oral contraceptive pill surveillance  -     drospirenone-ethinyl estradioL (TYLER, 28,) 3-0.03 mg per tablet; Take 1 tablet by mouth once daily.  Dispense: 28 tablet; Refill: 12    Bacterial vaginosis  -     metroNIDAZOLE (METROGEL) 0.75 % (37.5mg/5 gram) vaginal gel; Place 1 applicator vaginally every evening. for 5 days  Dispense: 5 applicator; Refill: " 0      PLAN:    UPT negative. Pelvic exam wnl.   Will increase estrogen potency in OCPs to see if this helps regulate BTB she is currently experiencing on 20 mcg EE OCP. Prescription sent for drospirenone-ethinyl estradioL (TYLER, 28,) 3-0.03 mg per tablet.   GC/CT and Affirm collected.   Was recently treated for BV infection but could not tolerate Metronidazole PO. Prescription sent for metroNIDAZOLE (METROGEL) 0.75 % (37.5mg/5 gram) vaginal gel to use every evening for 5 nights.  Encouraged Alec to keep follow-up appointment with ID to discuss HIV PrEP.    Follow up if symptoms worsen or fail to improve.

## 2024-07-24 LAB
BACTERIAL VAGINOSIS DNA: NEGATIVE
C TRACH DNA SPEC QL NAA+PROBE: NOT DETECTED
CANDIDA GLABRATA DNA: NEGATIVE
CANDIDA KRUSEI DNA: NEGATIVE
CANDIDA RRNA VAG QL PROBE: NEGATIVE
N GONORRHOEA DNA SPEC QL NAA+PROBE: NOT DETECTED
T VAGINALIS RRNA GENITAL QL PROBE: NEGATIVE

## 2024-07-25 ENCOUNTER — OFFICE VISIT (OUTPATIENT)
Dept: INFECTIOUS DISEASES | Facility: CLINIC | Age: 19
End: 2024-07-25
Payer: COMMERCIAL

## 2024-07-25 ENCOUNTER — LAB VISIT (OUTPATIENT)
Dept: LAB | Facility: HOSPITAL | Age: 19
End: 2024-07-25
Attending: INTERNAL MEDICINE
Payer: COMMERCIAL

## 2024-07-25 VITALS
TEMPERATURE: 98 F | BODY MASS INDEX: 47.09 KG/M2 | DIASTOLIC BLOOD PRESSURE: 83 MMHG | HEART RATE: 105 BPM | SYSTOLIC BLOOD PRESSURE: 128 MMHG | HEIGHT: 66 IN | WEIGHT: 293 LBS

## 2024-07-25 DIAGNOSIS — Z29.81 ENCOUNTER FOR HIV PRE-EXPOSURE PROPHYLAXIS: Primary | ICD-10-CM

## 2024-07-25 DIAGNOSIS — Z29.81 ENCOUNTER FOR HIV PRE-EXPOSURE PROPHYLAXIS: ICD-10-CM

## 2024-07-25 LAB
ALBUMIN SERPL BCP-MCNC: 2.9 G/DL (ref 3.5–5.2)
ALP SERPL-CCNC: 83 U/L (ref 55–135)
ALT SERPL W/O P-5'-P-CCNC: 11 U/L (ref 10–44)
ANION GAP SERPL CALC-SCNC: 7 MMOL/L (ref 8–16)
AST SERPL-CCNC: 13 U/L (ref 10–40)
BILIRUB SERPL-MCNC: 0.2 MG/DL (ref 0.1–1)
BUN SERPL-MCNC: 10 MG/DL (ref 6–20)
CALCIUM SERPL-MCNC: 9.1 MG/DL (ref 8.7–10.5)
CHLORIDE SERPL-SCNC: 110 MMOL/L (ref 95–110)
CO2 SERPL-SCNC: 23 MMOL/L (ref 23–29)
CREAT SERPL-MCNC: 1 MG/DL (ref 0.5–1.4)
EST. GFR  (NO RACE VARIABLE): >60 ML/MIN/1.73 M^2
GLUCOSE SERPL-MCNC: 102 MG/DL (ref 70–110)
HIV 1+2 AB+HIV1 P24 AG SERPL QL IA: NORMAL
POTASSIUM SERPL-SCNC: 3.9 MMOL/L (ref 3.5–5.1)
PROT SERPL-MCNC: 7.2 G/DL (ref 6–8.4)
SODIUM SERPL-SCNC: 140 MMOL/L (ref 136–145)

## 2024-07-25 PROCEDURE — 36415 COLL VENOUS BLD VENIPUNCTURE: CPT | Performed by: INTERNAL MEDICINE

## 2024-07-25 PROCEDURE — 99999 PR PBB SHADOW E&M-EST. PATIENT-LVL III: CPT | Mod: PBBFAC,,, | Performed by: INTERNAL MEDICINE

## 2024-07-25 PROCEDURE — 87389 HIV-1 AG W/HIV-1&-2 AB AG IA: CPT | Performed by: INTERNAL MEDICINE

## 2024-07-25 PROCEDURE — 80053 COMPREHEN METABOLIC PANEL: CPT | Performed by: INTERNAL MEDICINE

## 2024-07-25 RX ORDER — EMTRICITABINE AND TENOFOVIR DISOPROXIL FUMARATE 200; 300 MG/1; MG/1
1 TABLET, FILM COATED ORAL DAILY
Qty: 30 TABLET | Refills: 0 | Status: SHIPPED | OUTPATIENT
Start: 2024-07-25 | End: 2025-07-25

## 2024-07-25 NOTE — PROGRESS NOTES
"Infectious Disease Clinic Note  07/25/2024       Subjective:       Patient ID: Alec Araya is a 18 y.o. female being seen for an new visit.    Chief Complaint: Follow-up    HPI  19y/o F of bipolar disorder, anxiety and genital herpes who presents for prep.    Pt was referred by her obgyn. She has a hx of STIs (genital HSV and gonorrhea). Sexually active currently with 1 male partner who is hiv negative. Uses condoms. In the past used condoms "on and off."  Has never been on prep.    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     Patient is on birth control. Has recent negative pregnancy test on 7/23.    Family History   Problem Relation Name Age of Onset    Hypertension Paternal Grandmother      Diabetes Maternal Grandmother      Hypertension Maternal Grandmother      Hyperlipidemia Maternal Grandfather      Prostate cancer Maternal Grandfather      Hypertension Father      Eczema Mother      Asthma Mother       Social History     Socioeconomic History    Marital status: Single   Tobacco Use    Smoking status: Never    Smokeless tobacco: Never   Substance and Sexual Activity    Alcohol use: Never    Drug use: Yes     Types: Marijuana    Sexual activity: Never     Social Determinants of Health     Financial Resource Strain: Low Risk  (6/18/2024)    Received from [employer] [facility] and Its Subsidiaries and Affiliates    Overall Financial Resource Strain (CARDIA)     Difficulty of Paying Living Expenses: Not hard at all   Food Insecurity: No Food Insecurity (6/18/2024)    Received from [employer] [facility] and Its Subsidiaries and Affiliates    Hunger Vital Sign     Worried About Running Out of Food in the Last Year: Never true     Ran Out of Food in the Last Year: Never true   Transportation " Needs: No Transportation Needs (6/18/2024)    [facility]    PRAPARE - Transportation     Lack of Transportation (Medical): No     Lack of Transportation (Non-Medical): No   Physical Activity: Insufficiently Active (6/18/2024)    [facility]    Exercise Vital Sign     Days of Exercise per Week: 2 days     Minutes of Exercise per Session: 10 min   Stress: No Stress Concern Present (6/18/2024)    [facility]    [employer] [place] [employer] - Occupational Stress Questionnaire     Feeling of Stress : Only a little   Housing Stability: Unknown (2/21/2024)    Housing Stability Vital Sign     Unable to Pay for Housing in the Last Year: No     Unstable Housing in the Last Year: No     No past surgical history on file.    Patient's Medications   New Prescriptions    EMTRICITABINE-TENOFOVIR 200-300 MG (TRUVADA) 200-300 MG TAB    Take 1 tablet by mouth once daily.   Previous Medications    ACYCLOVIR (ZOVIRAX) 400 MG TABLET    Take 1 tablet (400 mg total) by mouth 2 (two) times daily.    ARIPIPRAZOLE (ABILIFY) 10 MG TAB    Take 10 mg by mouth.    CLINDAMYCIN (CLEOCIN T) 1 % EXTERNAL SOLUTION    Apply topically once daily.    DROSPIRENONE-ETHINYL ESTRADIOL (TYLER, 28,) 3-0.03 MG PER TABLET    Take 1 tablet by mouth once daily.    FLUOXETINE 20 MG CAPSULE    Take 20 mg by mouth.    METFORMIN (GLUCOPHAGE-XR) 500 MG ER 24HR TABLET    Take 1,000 mg by mouth.    METRONIDAZOLE (METROGEL) 0.75 % (37.5MG/5 GRAM) VAGINAL GEL    Place 1 applicator vaginally every evening. for 5 days    POLYETHYLENE GLYCOL (GLYCOLAX) 17 GRAM/DOSE POWDER    Take 17 g by mouth.    TRETINOIN (RETIN-A) 0.025 % CREAM    Apply topically every evening.   Modified Medications    No  "medications on file   Discontinued Medications    No medications on file       Patient Active Problem List    Diagnosis Date Noted    Bipolar disorder 06/09/2023    ADHD (attention deficit hyperactivity disorder) 10/30/2013       Review of Systems   Review of Systems   Constitutional:  Negative for chills, fever and malaise/fatigue.   HENT:  Negative for congestion and sore throat.    Eyes:  Negative for blurred vision and double vision.   Respiratory:  Negative for cough and shortness of breath.    Cardiovascular:  Negative for chest pain and palpitations.   Gastrointestinal:  Negative for diarrhea and vomiting.   Genitourinary:  Negative for dysuria.   Musculoskeletal:  Negative for myalgias and neck pain.   Skin:  Negative for itching and rash.   Neurological:  Negative for dizziness and headaches.   Psychiatric/Behavioral:  Negative for substance abuse. The patient is not nervous/anxious.    All other systems reviewed and are negative.          Objective:      /83 (BP Location: Right arm)   Pulse 105   Temp 98.2 °F (36.8 °C) (Oral)   Ht 5' 6" (1.676 m)   Wt (!) 162.1 kg (357 lb 5.9 oz)   BMI 57.68 kg/m²   Estimated body mass index is 57.68 kg/m² as calculated from the following:    Height as of this encounter: 5' 6" (1.676 m).    Weight as of this encounter: 162.1 kg (357 lb 5.9 oz).    Physical Exam  Constitutional:       General: She is not in acute distress.     Appearance: She is well-developed.   HENT:      Head: Normocephalic and atraumatic.   Eyes:      Conjunctiva/sclera: Conjunctivae normal.      Pupils: Pupils are equal, round, and reactive to light.   Cardiovascular:      Rate and Rhythm: Normal rate and regular rhythm.      Heart sounds: Normal heart sounds.   Pulmonary:      Effort: Pulmonary effort is normal. No respiratory distress.      Breath sounds: Normal breath sounds.   Abdominal:      General: Bowel sounds are normal. There is no distension.      Palpations: Abdomen is soft. "   Musculoskeletal:         General: Normal range of motion.      Cervical back: Normal range of motion and neck supple.   Skin:     General: Skin is warm and dry.   Neurological:      General: No focal deficit present.      Mental Status: She is alert and oriented to person, place, and time.      Cranial Nerves: No cranial nerve deficit.   Psychiatric:         Mood and Affect: Mood normal.         Behavior: Behavior normal.         Assessment:         1. Encounter for HIV pre-exposure prophylaxis  emtricitabine-tenofovir 200-300 mg (TRUVADA) 200-300 mg Tab    HIV 1/2 Ag/Ab (4th Gen)    Comprehensive Metabolic Panel    CANCELED: Pregnancy, urine rapid            Plan:       Alec was seen today for follow-up.    Diagnoses and all orders for this visit:    Encounter for HIV pre-exposure prophylaxis  19y/o F who presents for initiation of HIV PrEP. Pt interested in injectable apretude. Discussed potential med side effects including depression. Pt does have a hx of depression/ bipolar disorder which can be exacerbated by cabotegravir, so is opting to start on truvada instead.  Recent labs reviewed including STI screening and pregnancy test- all negative. Pt agrees to HIV testing in 1 month and every 3 mths thereafter prior to prep refills.     Plan:  -will repeat baseline HIV and CMP prior to initiation of truvada        -     send script for emtricitabine-tenofovir 200-300 mg (TRUVADA) 200-300 mg Tab; Take 1 tablet by mouth once daily.        -will repeat HIV test and BMP in 1 mth and assess for adherence / side effects        -counseled on the importance of safe sex practices and condom use.         -counseled on signs and symptoms of acute HIV      RTC in 1 mth  Haydee Gates MD  Infectious Disease     Total professional time spent for the encounter: 40 minutes  Time was spent preparing to see the patient, reviewing results of prior testing, obtaining and/or reviewing separately obtained history,  performing a medically appropriate examination and interview, counseling and educating the patient/family, ordering medications/tests/procedures, referring and communicating with other health care professionals, documenting clinical information in the electronic health record, and independently interpreting results.

## 2024-07-26 ENCOUNTER — PATIENT MESSAGE (OUTPATIENT)
Dept: INFECTIOUS DISEASES | Facility: CLINIC | Age: 19
End: 2024-07-26
Payer: COMMERCIAL

## 2024-07-29 PROBLEM — Z29.81 ENCOUNTER FOR HIV PRE-EXPOSURE PROPHYLAXIS: Status: ACTIVE | Noted: 2024-07-29

## 2024-08-29 DIAGNOSIS — Z79.899 ENCOUNTER FOR LONG-TERM CURRENT USE OF MEDICATION: ICD-10-CM

## 2024-08-29 DIAGNOSIS — Z29.81 ENCOUNTER FOR HIV PRE-EXPOSURE PROPHYLAXIS: ICD-10-CM

## 2024-08-29 DIAGNOSIS — Z29.81 ENCOUNTER FOR HIV PRE-EXPOSURE PROPHYLAXIS: Primary | ICD-10-CM

## 2024-08-29 RX ORDER — EMTRICITABINE AND TENOFOVIR DISOPROXIL FUMARATE 200; 300 MG/1; MG/1
1 TABLET, FILM COATED ORAL DAILY
Qty: 30 TABLET | Refills: 0 | OUTPATIENT
Start: 2024-08-29 | End: 2025-08-29

## 2024-08-29 NOTE — TELEPHONE ENCOUNTER
Left pt a voicemail letting her know that  would like for me to schedule her virtual f/u appt and labs within the next few weeks.

## 2024-10-22 ENCOUNTER — PATIENT MESSAGE (OUTPATIENT)
Dept: INFECTIOUS DISEASES | Facility: CLINIC | Age: 19
End: 2024-10-22
Payer: COMMERCIAL

## 2024-10-23 NOTE — PROGRESS NOTES
Chief Complaint: STI Screening      HPI:      Alec is a 18 y.o.  who presents today for STI screening. She is asymptomatic - denies vaginal/urinary concerns or pelvic pain.     Menses are regular w/ OCPs. Patient's last menstrual period was 2024 (exact date).     Alec  is currently sexually active . She is currently using oral contraceptives (estrogen/progesterone) for contraception.   drospirenone-ethinyl estradioL (TYLER, 28,) 3-0.03 mg per tablet  Tolerating OCPs well. Denies BTB. Remembers to take pills daily.     She saw ID in 2024 to discuss HIV prep.   She started taking the Truvada but it made her nauseous   Planning labs today  Follow up w/ ID in 2024     Gardasil:Completed     Past Medical History:   Diagnosis Date    ADHD        Current Outpatient Medications:     acyclovir (ZOVIRAX) 400 MG tablet, Take 1 tablet (400 mg total) by mouth 2 (two) times daily., Disp: 60 tablet, Rfl: 11    ARIPiprazole (ABILIFY) 10 MG Tab, Take 10 mg by mouth., Disp: , Rfl:     clindamycin (CLEOCIN T) 1 % external solution, Apply topically once daily., Disp: 60 mL, Rfl: 3    drospirenone-ethinyl estradioL (TYLER, 28,) 3-0.03 mg per tablet, Take 1 tablet by mouth once daily., Disp: 28 tablet, Rfl: 12    FLUoxetine 20 MG capsule, Take 20 mg by mouth., Disp: , Rfl:     tretinoin (RETIN-A) 0.025 % cream, Apply topically every evening., Disp: 45 g, Rfl: 3    emtricitabine-tenofovir 200-300 mg (TRUVADA) 200-300 mg Tab, Take 1 tablet by mouth once daily. (Patient not taking: Reported on 10/24/2024), Disp: 30 tablet, Rfl: 0   Review of patient's allergies indicates:   Allergen Reactions    Penicillins      History reviewed. No pertinent surgical history.  Social History     Tobacco Use    Smoking status: Never    Smokeless tobacco: Never   Substance Use Topics    Alcohol use: Never    Drug use: Yes     Types: Marijuana     Family History   Problem Relation Name Age of Onset    Hypertension Paternal  "Grandmother      Diabetes Maternal Grandmother      Hypertension Maternal Grandmother      Hyperlipidemia Maternal Grandfather      Prostate cancer Maternal Grandfather      Hypertension Father      Eczema Mother      Asthma Mother         Physical Exam:      PHYSICAL EXAM:  Ht 5' 6" (1.676 m)   Wt (!) 163.1 kg (359 lb 9.1 oz)   LMP 09/29/2024 (Exact Date)   BMI 58.04 kg/m²   Body mass index is 58.04 kg/m².     APPEARANCE: Well nourished, well developed, in no acute distress.      Assessment/Plan:     Routine screening for STI (sexually transmitted infection)  -     Cancel: POCT urine pregnancy  -     C. trachomatis/N. gonorrhoeae by AMP DNA [facility]; Cervicovaginal  -     Vaginosis Screen by DNA Probe  -     Hepatitis B Surface Antigen; Future; Expected date: 10/24/2024  -     Treponema Pallidium Antibodies IgG, IgM; Future; Expected date: 10/24/2024  -     Hepatitis C Virus Quantitative; Future; Expected date: 10/24/2024        PLAN:    GC/CT and Affirm self-collected. Serum STI screening ordered.     Follow up in about 3 months (around 1/24/2025) for routine STI screening and pap .          "

## 2024-10-24 ENCOUNTER — LAB VISIT (OUTPATIENT)
Dept: LAB | Facility: OTHER | Age: 19
End: 2024-10-24
Attending: INTERNAL MEDICINE
Payer: COMMERCIAL

## 2024-10-24 ENCOUNTER — OFFICE VISIT (OUTPATIENT)
Dept: UROGYNECOLOGY | Facility: CLINIC | Age: 19
End: 2024-10-24
Payer: COMMERCIAL

## 2024-10-24 VITALS — HEIGHT: 66 IN | BODY MASS INDEX: 47.09 KG/M2 | WEIGHT: 293 LBS

## 2024-10-24 DIAGNOSIS — Z29.81 ENCOUNTER FOR HIV PRE-EXPOSURE PROPHYLAXIS: ICD-10-CM

## 2024-10-24 DIAGNOSIS — Z11.3 ROUTINE SCREENING FOR STI (SEXUALLY TRANSMITTED INFECTION): Primary | ICD-10-CM

## 2024-10-24 DIAGNOSIS — Z11.3 ROUTINE SCREENING FOR STI (SEXUALLY TRANSMITTED INFECTION): ICD-10-CM

## 2024-10-24 DIAGNOSIS — Z79.899 ENCOUNTER FOR LONG-TERM CURRENT USE OF MEDICATION: ICD-10-CM

## 2024-10-24 LAB
ANION GAP SERPL CALC-SCNC: 9 MMOL/L (ref 8–16)
BUN SERPL-MCNC: 11 MG/DL (ref 6–20)
CALCIUM SERPL-MCNC: 9.2 MG/DL (ref 8.7–10.5)
CHLORIDE SERPL-SCNC: 109 MMOL/L (ref 95–110)
CO2 SERPL-SCNC: 22 MMOL/L (ref 23–29)
CREAT SERPL-MCNC: 0.9 MG/DL (ref 0.5–1.4)
EST. GFR  (NO RACE VARIABLE): >60 ML/MIN/1.73 M^2
GLUCOSE SERPL-MCNC: 88 MG/DL (ref 70–110)
HBV SURFACE AG SERPL QL IA: NORMAL
HIV 1+2 AB+HIV1 P24 AG SERPL QL IA: NEGATIVE
POTASSIUM SERPL-SCNC: 3.8 MMOL/L (ref 3.5–5.1)
SODIUM SERPL-SCNC: 140 MMOL/L (ref 136–145)
TREPONEMA PALLIDUM IGG+IGM AB [PRESENCE] IN SERUM OR PLASMA BY IMMUNOASSAY: NONREACTIVE

## 2024-10-24 PROCEDURE — 80048 BASIC METABOLIC PNL TOTAL CA: CPT | Performed by: INTERNAL MEDICINE

## 2024-10-24 PROCEDURE — 87491 CHLMYD TRACH DNA AMP PROBE: CPT | Performed by: NURSE PRACTITIONER

## 2024-10-24 PROCEDURE — 86593 SYPHILIS TEST NON-TREP QUANT: CPT | Performed by: NURSE PRACTITIONER

## 2024-10-24 PROCEDURE — 1159F MED LIST DOCD IN RCRD: CPT | Mod: CPTII,S$GLB,, | Performed by: NURSE PRACTITIONER

## 2024-10-24 PROCEDURE — 36415 COLL VENOUS BLD VENIPUNCTURE: CPT | Performed by: INTERNAL MEDICINE

## 2024-10-24 PROCEDURE — 87340 HEPATITIS B SURFACE AG IA: CPT | Performed by: NURSE PRACTITIONER

## 2024-10-24 PROCEDURE — 3044F HG A1C LEVEL LT 7.0%: CPT | Mod: CPTII,S$GLB,, | Performed by: NURSE PRACTITIONER

## 2024-10-24 PROCEDURE — 99213 OFFICE O/P EST LOW 20 MIN: CPT | Mod: S$GLB,,, | Performed by: NURSE PRACTITIONER

## 2024-10-24 PROCEDURE — 99999 PR PBB SHADOW E&M-EST. PATIENT-LVL III: CPT | Mod: PBBFAC,,, | Performed by: NURSE PRACTITIONER

## 2024-10-24 PROCEDURE — 87389 HIV-1 AG W/HIV-1&-2 AB AG IA: CPT | Performed by: INTERNAL MEDICINE

## 2024-10-24 PROCEDURE — 3008F BODY MASS INDEX DOCD: CPT | Mod: CPTII,S$GLB,, | Performed by: NURSE PRACTITIONER

## 2024-10-24 PROCEDURE — 0352U VAGINOSIS SCREEN BY DNA PROBE: CPT | Performed by: NURSE PRACTITIONER

## 2024-10-26 LAB
BACTERIAL VAGINOSIS DNA: DETECTED
C TRACH DNA SPEC QL NAA+PROBE: NOT DETECTED
CANDIDA GLABRATA/KRUSEI: NOT DETECTED
CANDIDA RRNA VAG QL PROBE: NOT DETECTED
N GONORRHOEA DNA SPEC QL NAA+PROBE: NOT DETECTED
TRICHOMONAS VAGINALIS: NOT DETECTED

## 2024-11-08 ENCOUNTER — TELEPHONE (OUTPATIENT)
Dept: DERMATOLOGY | Facility: CLINIC | Age: 19
End: 2024-11-08
Payer: COMMERCIAL

## 2024-11-08 NOTE — TELEPHONE ENCOUNTER
Attempted to return call. No answer. Message left to return call  ----- Message from Magdy sent at 11/8/2024  8:38 AM CST -----  Contact: [phone]  Type:  Sooner Apoointment Request    Caller is requesting a sooner appointment.  Caller declined first available appointment listed below.  Caller will not accept being placed on the waitlist and is requesting a message be sent to doctor.  Name of Caller:Alec   When is the first available appointment?2/2025  Symptoms:acne   Would the patient rather a call back or a response via [facility]? Call Back   Best Call Back Number:[phone]   Additional Information: n/a      Thanks KB

## 2024-12-18 ENCOUNTER — OFFICE VISIT (OUTPATIENT)
Dept: OBSTETRICS AND GYNECOLOGY | Facility: CLINIC | Age: 19
End: 2024-12-18
Payer: COMMERCIAL

## 2024-12-18 ENCOUNTER — LAB VISIT (OUTPATIENT)
Dept: LAB | Facility: HOSPITAL | Age: 19
End: 2024-12-18
Attending: INTERNAL MEDICINE
Payer: COMMERCIAL

## 2024-12-18 ENCOUNTER — CLINICAL SUPPORT (OUTPATIENT)
Dept: INFECTIOUS DISEASES | Facility: CLINIC | Age: 19
End: 2024-12-18
Payer: COMMERCIAL

## 2024-12-18 VITALS
BODY MASS INDEX: 45.99 KG/M2 | WEIGHT: 293 LBS | HEIGHT: 67 IN | SYSTOLIC BLOOD PRESSURE: 125 MMHG | DIASTOLIC BLOOD PRESSURE: 89 MMHG

## 2024-12-18 DIAGNOSIS — Z29.81 ENCOUNTER FOR HIV PRE-EXPOSURE PROPHYLAXIS: Primary | ICD-10-CM

## 2024-12-18 DIAGNOSIS — Z11.3 ROUTINE SCREENING FOR STI (SEXUALLY TRANSMITTED INFECTION): Primary | ICD-10-CM

## 2024-12-18 DIAGNOSIS — Z12.4 CERVICAL CANCER SCREENING: ICD-10-CM

## 2024-12-18 DIAGNOSIS — N89.8 VAGINAL DISCHARGE: ICD-10-CM

## 2024-12-18 DIAGNOSIS — Z29.81 ENCOUNTER FOR HIV PRE-EXPOSURE PROPHYLAXIS: ICD-10-CM

## 2024-12-18 DIAGNOSIS — Z11.3 ROUTINE SCREENING FOR STI (SEXUALLY TRANSMITTED INFECTION): ICD-10-CM

## 2024-12-18 LAB
ANION GAP SERPL CALC-SCNC: 6 MMOL/L (ref 8–16)
BUN SERPL-MCNC: 11 MG/DL (ref 6–20)
CALCIUM SERPL-MCNC: 9.1 MG/DL (ref 8.7–10.5)
CHLORIDE SERPL-SCNC: 107 MMOL/L (ref 95–110)
CO2 SERPL-SCNC: 23 MMOL/L (ref 23–29)
CREAT SERPL-MCNC: 1 MG/DL (ref 0.5–1.4)
EST. GFR  (NO RACE VARIABLE): >60 ML/MIN/1.73 M^2
GLUCOSE SERPL-MCNC: 111 MG/DL (ref 70–110)
HBV SURFACE AG SERPL QL IA: NORMAL
HCV AB SERPL QL IA: NORMAL
HIV 1+2 AB+HIV1 P24 AG SERPL QL IA: NORMAL
POTASSIUM SERPL-SCNC: 3.8 MMOL/L (ref 3.5–5.1)
SODIUM SERPL-SCNC: 136 MMOL/L (ref 136–145)
TREPONEMA PALLIDUM IGG+IGM AB [PRESENCE] IN SERUM OR PLASMA BY IMMUNOASSAY: NONREACTIVE

## 2024-12-18 PROCEDURE — 99999 PR PBB SHADOW E&M-EST. PATIENT-LVL III: CPT | Mod: PBBFAC,,, | Performed by: NURSE PRACTITIONER

## 2024-12-18 PROCEDURE — 86803 HEPATITIS C AB TEST: CPT | Performed by: NURSE PRACTITIONER

## 2024-12-18 PROCEDURE — 87340 HEPATITIS B SURFACE AG IA: CPT | Performed by: NURSE PRACTITIONER

## 2024-12-18 PROCEDURE — 86593 SYPHILIS TEST NON-TREP QUANT: CPT | Performed by: NURSE PRACTITIONER

## 2024-12-18 PROCEDURE — 80048 BASIC METABOLIC PNL TOTAL CA: CPT | Performed by: INTERNAL MEDICINE

## 2024-12-18 PROCEDURE — 3044F HG A1C LEVEL LT 7.0%: CPT | Mod: CPTII,S$GLB,, | Performed by: NURSE PRACTITIONER

## 2024-12-18 PROCEDURE — 87389 HIV-1 AG W/HIV-1&-2 AB AG IA: CPT | Performed by: INTERNAL MEDICINE

## 2024-12-18 PROCEDURE — 3008F BODY MASS INDEX DOCD: CPT | Mod: CPTII,S$GLB,, | Performed by: NURSE PRACTITIONER

## 2024-12-18 PROCEDURE — 3079F DIAST BP 80-89 MM HG: CPT | Mod: CPTII,S$GLB,, | Performed by: NURSE PRACTITIONER

## 2024-12-18 PROCEDURE — 36415 COLL VENOUS BLD VENIPUNCTURE: CPT | Performed by: INTERNAL MEDICINE

## 2024-12-18 PROCEDURE — 3074F SYST BP LT 130 MM HG: CPT | Mod: CPTII,S$GLB,, | Performed by: NURSE PRACTITIONER

## 2024-12-18 PROCEDURE — 1159F MED LIST DOCD IN RCRD: CPT | Mod: CPTII,S$GLB,, | Performed by: NURSE PRACTITIONER

## 2024-12-18 PROCEDURE — 87491 CHLMYD TRACH DNA AMP PROBE: CPT | Performed by: NURSE PRACTITIONER

## 2024-12-18 PROCEDURE — 0352U VAGINOSIS SCREEN BY DNA PROBE: CPT | Performed by: NURSE PRACTITIONER

## 2024-12-18 PROCEDURE — 99213 OFFICE O/P EST LOW 20 MIN: CPT | Mod: S$GLB,,, | Performed by: NURSE PRACTITIONER

## 2024-12-18 PROCEDURE — 99214 OFFICE O/P EST MOD 30 MIN: CPT | Mod: 95,,, | Performed by: INTERNAL MEDICINE

## 2024-12-18 RX ORDER — EMTRICITABINE AND TENOFOVIR DISOPROXIL FUMARATE 200; 300 MG/1; MG/1
1 TABLET, FILM COATED ORAL DAILY
Qty: 30 TABLET | Refills: 2 | Status: SHIPPED | OUTPATIENT
Start: 2024-12-18 | End: 2024-12-18

## 2024-12-18 RX ORDER — EMTRICITABINE AND TENOFOVIR DISOPROXIL FUMARATE 200; 300 MG/1; MG/1
1 TABLET, FILM COATED ORAL DAILY
Qty: 30 TABLET | Refills: 2 | Status: ACTIVE | OUTPATIENT
Start: 2024-12-18 | End: 2025-12-18

## 2024-12-18 NOTE — PROGRESS NOTES
Chief Complaint: STI Screening      HPI:      Alec is a 19 y.o.  who presents today for routine STI screening. Saw ID today - planning to restart Truvada.    Has noticed increase in vaginal discharge. Denies vaginal odor or itching/irritation. Denies urinary concerns. Denies pelvic pain.      Bacterial vaginosis DNA   2024 Positive !    10/24/2024 Detected !        Chlamydia, Amplified DNA   2024 Detected !        N gonorrhoeae, amplified DNA   2024 Detected !    2024 Detected !       Menses are regular w/ OCPs. Patient's last menstrual period was 2024.     Alec  is currently sexually active . She is currently using oral contraceptives (estrogen/progesterone) for contraception.   First 3-4 days of pill week     Has never had a pap test before.     Gardasil:Completed     Past Medical History:   Diagnosis Date    ADHD        Current Outpatient Medications:     acyclovir (ZOVIRAX) 400 MG tablet, Take 1 tablet (400 mg total) by mouth 2 (two) times daily., Disp: 60 tablet, Rfl: 11    ARIPiprazole (ABILIFY) 10 MG Tab, Take 10 mg by mouth., Disp: , Rfl:     clindamycin (CLEOCIN T) 1 % external solution, Apply topically once daily., Disp: 60 mL, Rfl: 3    drospirenone-ethinyl estradioL (TYLER, 28,) 3-0.03 mg per tablet, Take 1 tablet by mouth once daily., Disp: 28 tablet, Rfl: 12    emtricitabine-tenofovir 200-300 mg (TRUVADA) 200-300 mg Tab, Take 1 tablet by mouth once daily., Disp: 30 tablet, Rfl: 0    FLUoxetine 20 MG capsule, Take 20 mg by mouth., Disp: , Rfl:     tretinoin (RETIN-A) 0.025 % cream, Apply topically every evening., Disp: 45 g, Rfl: 3   Review of patient's allergies indicates:   Allergen Reactions    Penicillins      History reviewed. No pertinent surgical history.  Social History     Tobacco Use    Smoking status: Never    Smokeless tobacco: Never   Substance Use Topics    Alcohol use: Never    Drug use: Yes     Types: Marijuana     Family History   Problem Relation  "Name Age of Onset    Hypertension Paternal Grandmother      Diabetes Maternal Grandmother      Hypertension Maternal Grandmother      Hyperlipidemia Maternal Grandfather      Prostate cancer Maternal Grandfather      Hypertension Father      Eczema Mother      Asthma Mother         Physical Exam:      PHYSICAL EXAM:  /89   Ht 5' 7" (1.702 m)   Wt (!) 163.5 kg (360 lb 7.2 oz)   LMP 11/27/2024   BMI 56.45 kg/m²   Body mass index is 56.45 kg/m².     APPEARANCE: Well nourished, well developed, in no acute distress.  PELVIC:  External genitalia and urethra within normal limits. Vagina without lesions, without discharge, without erythema, without ulcers.  Cervix without cervical motion tenderness, non-friable. Bimanaul exam deferred - low risk, asymptomatic.     Verbal consent obtained     Chaperoned by: FABIANA Ramirez MA     Assessment/Plan:     Routine screening for STI (sexually transmitted infection)  -     Vaginosis Screen by DNA Probe  -     C. trachomatis/N. gonorrhoeae by AMP DNA [employer]; Cervicovaginal  -     Hepatitis C Antibody; Future; Expected date: 12/18/2024  -     Treponema Pallidium Antibodies IgG, IgM; Future; Expected date: 12/18/2024  -     Hepatitis B Surface Antigen; Future; Expected date: 12/18/2024    Vaginal discharge  -     Vaginosis Screen by DNA Probe  -     C. trachomatis/N. gonorrhoeae by AMP DNA [employer]; Cervicovaginal    Cervical cancer screening  -     Liquid-Based Pap Smear, Screening        PLAN:    Serum STI screening ordered. GC/CT and affirm collected for screening purposes and for further evaluation of vaginal discharge.   If recurrent BV, consider alternative treatment/extended Metronidazole treatment.  Vulvar/vaginal skin care discussed:  Keep your vulva clean by rinsing with warm water and gently patting, not rubbing, it dry.  Do not use perfumed soap or scented toilet paper. Do not use feminine hygiene sprays. Do not use pads or tampons that contain a deodorant or a " plastic coating  Do not wear tight-fitting pants or underwear. Wear only cotton underwear.  Do not douche. It is better to let the vagina cleanse itself.   Use a lubricant that is water soluble and unscented (e.g. Good Clean Love)  Can try an oral probiotic or vaginal probiotic that is predominately lactobacilli   Oral probiotic (Bonafide Clairvee)   Vaginal probiotics (Vitanica V-probiotic)   Boric Acid - Insert 1 boric acid suppository into vagina as needed, preferably before bedtime. If needed - use one suppository every 12 hours. Can use to prevent vaginal odor by administering a suppository during/after period, after intercourse, during antibiotic use or by inserting a suppository 2-4 times per week.    Pap w/ cytology alone collected.     Follow up if symptoms worsen or fail to improve.

## 2024-12-18 NOTE — PROGRESS NOTES
"The patient location is: home  The chief complaint leading to consultation is: hiv prep    Visit type: audiovisual    Face to Face time with patient: 10mins  30 minutes of total time spent on the encounter, which includes face to face time and non-face to face time preparing to see the patient (eg, review of tests), Obtaining and/or reviewing separately obtained history, Documenting clinical information in the electronic or other health record, Independently interpreting results (not separately reported) and communicating results to the patient/family/caregiver, or Care coordination (not separately reported).         Each patient to whom he or she provides medical services by telemedicine is:  (1) informed of the relationship between the physician and patient and the respective role of any other health care provider with respect to management of the patient; and (2) notified that he or she may decline to receive medical services by telemedicine and may withdraw from such care at any time.    Notes:    Infectious Disease Clinic Note  12/18/2024       Subjective:       Patient ID: Alec Araya is a 19 y.o. female being seen for an new visit.    Chief Complaint: Follow-up    HPI  18y/o F of bipolar disorder, anxiety and genital herpes who presents for prep.    Last seen 7/25. Started on Truvada at the time. Reports she has a hard time swallowing the pill. For a while it was causing her some abdominal pain. She stopped taking medication in late October. Tested neg for HIV on 10/24/24. She is interested in restarting Truvada if she can crush or dissolve it in water.     Sexually active.  Had unprotected sex recently. No genitourinary complaints reported. Has a follow-up with her obgyn today.       Initial visit: Pt was referred by her obgyn. She has a hx of STIs (genital HSV and gonorrhea). Sexually active currently with 1 male partner who is hiv negative. Uses condoms. 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     Patient is on birth control. Has recent negative pregnancy test on 7/23.    Family History   Problem Relation Name Age of Onset    Hypertension Paternal Grandmother      Diabetes Maternal Grandmother      Hypertension Maternal Grandmother      Hyperlipidemia Maternal Grandfather      Prostate cancer Maternal Grandfather      Hypertension Father      Eczema Mother      Asthma Mother       Social History     Socioeconomic History    Marital status: Single   Tobacco Use    Smoking status: Never    Smokeless tobacco: Never   Substance and Sexual Activity    Alcohol use: Never    Drug use: Yes     Types: Marijuana    Sexual activity: Never     Social Drivers of Health     Financial Resource Strain: Low Risk  (6/18/2024)    Received from [facility] Subsidiaries and Affiliates    Overall Financial Resource Strain (CARDIA)     Difficulty of Paying Living Expenses: Not hard at all   Food Insecurity: No Food Insecurity (6/18/2024)    Received from [facility] Subsidi[facility] and [facility]    Hunger Vital Sign     Worried About Running Out of Food in the Last Year: Never true     Ran Out of Food in the Last Year: Never true   Transportation Needs: No Transportation Needs (6/18/2024)    Received from [facility] Subsidi[facility] and [facility]    PRAPARE - Transportation     Lack of Transportation (Medical): No     Lack of Transportation (Non-Medical): No   Physical Activity: Insufficiently Active (6/18/2024)    Received from [facility] Subsidiaries and Affiliates    Exercise Vital Sign     Days of Exercise per Week: 2 days     Minutes of  Exercise per Session: 10 min   Stress: No Stress Concern Present (6/18/2024)    Received from [facility] Subsidiaries and [facility]ates    [employer] - Occupational Stress Questionnaire     Feeling of Stress : Only a little   Housing Stability: Unknown (2/21/2024)    Housing Stability Vital Sign     Unable to Pay for Housing in the Last Year: No     Unstable Housing in the Last Year: No     No past surgical history on file.    Patient's Medications   New Prescriptions    No medications on file   Previous Medications    ACYCLOVIR (ZOVIRAX) 400 MG TABLET    Take 1 tablet (400 mg total) by mouth 2 (two) times daily.    ARIPIPRAZOLE (ABILIFY) 10 MG TAB    Take 10 mg by mouth.    CLINDAMYCIN (CLEOCIN T) 1 % EXTERNAL SOLUTION    Apply topically once daily.    DROSPIRENONE-ETHINYL ESTRADIOL (TYLER, 28,) 3-0.03 MG PER TABLET    Take 1 tablet by mouth once daily.    EMTRICITABINE-TENOFOVIR 200-300 MG (TRUVADA) 200-300 MG TAB    Take 1 tablet by mouth once daily.    FLUOXETINE 20 MG CAPSULE    Take 20 mg by mouth.    TRETINOIN (RETIN-A) 0.025 % CREAM    Apply topically every evening.   Modified Medications    No medications on file   Discontinued Medications    No medications on file       Patient Active Problem List    Diagnosis Date Noted    Encounter for HIV pre-exposure prophylaxis 07/29/2024    Bipolar disorder 06/09/2023    ADHD (attention deficit hyperactivity disorder) 10/30/2013       Review of Systems   Review of Systems   Constitutional:  Negative for chills, fever and malaise/fatigue.   HENT:  Negative for congestion and sore throat.    Eyes:  Negative for blurred vision and double vision.   Respiratory:  Negative for cough and shortness of breath.    Cardiovascular:  Negative for chest pain and palpitations.   Gastrointestinal:  Negative for diarrhea and vomiting.   Genitourinary:  Negative for dysuria.   Musculoskeletal:  Negative for  "myalgias and neck pain.   Skin:  Negative for itching and rash.   Neurological:  Negative for dizziness and headaches.   Psychiatric/Behavioral:  Negative for substance abuse. The patient is not nervous/anxious.    All other systems reviewed and are negative.          Objective:      There were no vitals taken for this visit.  Estimated body mass index is 58.04 kg/m² as calculated from the following:    Height as of 10/24/24: 5' 6" (1.676 m).    Weight as of 10/24/24: 163.1 kg (359 lb 9.1 oz).    Physical Exam  Constitutional:       General: She is not in acute distress.     Appearance: She is well-developed.   HENT:      Head: Normocephalic and atraumatic.   Eyes:      Conjunctiva/sclera: Conjunctivae normal.      Pupils: Pupils are equal, round, and reactive to light.   Cardiovascular:      Rate and Rhythm: Normal rate and regular rhythm.      Heart sounds: Normal heart sounds.   Pulmonary:      Effort: Pulmonary effort is normal. No respiratory distress.      Breath sounds: Normal breath sounds.   Abdominal:      General: Bowel sounds are normal. There is no distension.      Palpations: Abdomen is soft.   Musculoskeletal:         General: Normal range of motion.      Cervical back: Normal range of motion and neck supple.   Skin:     General: Skin is warm and dry.   Neurological:      General: No focal deficit present.      Mental Status: She is alert and oriented to person, place, and time.      Cranial Nerves: No cranial nerve deficit.   Psychiatric:         Mood and Affect: Mood normal.         Behavior: Behavior normal.         Assessment:         1. Encounter for HIV pre-exposure prophylaxis  HIV 1/2 Ag/Ab (4th Gen)    BASIC METABOLIC PANEL              Plan:       Alec was seen today for follow-up.    Diagnoses and all orders for this visit:    Encounter for HIV pre-exposure prophylaxis  17y/o F  on Truvada for HIV PreP with nonadherence due to difficulty swallowing pill and GI issues for the last mth. Is " interested in restarting.       Plan:  --will repeat   HIV and BMP prior to initiation of truvada  --ok to dissolve truvada in 100cc of water or orange juice and take immediately after  --Pt agrees to HIV testing in 1 month and every 3 mths thereafter prior to prep refills.         --counseled on the importance of safe sex practices and condom use.         --counseled on signs and symptoms of acute HIV        --will be undergoing STI screening with obgyn today      RTC in 3 mth  Haydee Gates MD  Infectious Disease     Total professional time spent for the encounter: 30 minutes  Time was spent preparing to see the patient, reviewing results of prior testing, obtaining and/or reviewing separately obtained history, performing a medically appropriate examination and interview, counseling and educating the patient/family, ordering medications/tests/procedures, referring and communicating with other health care professionals, documenting clinical information in the electronic health record, and independently interpreting results.

## 2024-12-18 NOTE — PATIENT INSTRUCTIONS
Vulvar/vaginal skin care discussed  Keep your vulva clean by rinsing with warm water and gently patting, not rubbing, it dry.  Do not use perfumed soap or scented toilet paper. Do not use feminine hygiene sprays. Do not use pads or tampons that contain a deodorant or a plastic coating  Do not wear tight-fitting pants or underwear. Wear only cotton underwear.  Do not douche. It is better to let the vagina cleanse itself.   Use a lubricant that is water soluble and unscented (e.g. Good Clean Love)  Can try an oral probiotic or vaginal probiotic that is predominately lactobacilli   Oral probiotic (Bonafide Clairvee)   Vaginal probiotics (Vitanica V-probiotic)    Boric Acid - Insert 1 boric acid suppository into vagina as needed, preferably before bedtime. If needed - use one suppository every 12 hours. Can use to prevent vaginal odor by administering a suppository during/after period, after intercourse, during antibiotic use or by inserting a suppository 2-4 times per week.

## 2024-12-21 LAB
BACTERIAL VAGINOSIS DNA: NOT DETECTED
C TRACH DNA SPEC QL NAA+PROBE: NOT DETECTED
CANDIDA GLABRATA/KRUSEI: NOT DETECTED
CANDIDA RRNA VAG QL PROBE: DETECTED
N GONORRHOEA DNA SPEC QL NAA+PROBE: NOT DETECTED
TRICHOMONAS VAGINALIS: NOT DETECTED

## 2024-12-23 RX ORDER — FLUCONAZOLE 150 MG/1
150 TABLET ORAL
Qty: 2 TABLET | Refills: 0 | Status: SHIPPED | OUTPATIENT
Start: 2024-12-23 | End: 2024-12-27

## 2024-12-27 ENCOUNTER — PATIENT MESSAGE (OUTPATIENT)
Dept: OBSTETRICS AND GYNECOLOGY | Facility: CLINIC | Age: 19
End: 2024-12-27
Payer: COMMERCIAL

## 2025-01-21 ENCOUNTER — PATIENT MESSAGE (OUTPATIENT)
Dept: INFECTIOUS DISEASES | Facility: CLINIC | Age: 20
End: 2025-01-21
Payer: COMMERCIAL

## 2025-01-21 DIAGNOSIS — Z29.81 ENCOUNTER FOR HIV PRE-EXPOSURE PROPHYLAXIS: Primary | ICD-10-CM

## 2025-01-31 ENCOUNTER — OFFICE VISIT (OUTPATIENT)
Dept: OTOLARYNGOLOGY | Facility: CLINIC | Age: 20
End: 2025-01-31
Payer: COMMERCIAL

## 2025-01-31 VITALS — BODY MASS INDEX: 32.46 KG/M2 | HEIGHT: 67 IN | WEIGHT: 206.81 LBS

## 2025-01-31 DIAGNOSIS — J01.91 ACUTE RECURRENT SINUSITIS, UNSPECIFIED LOCATION: Primary | ICD-10-CM

## 2025-01-31 PROCEDURE — 99204 OFFICE O/P NEW MOD 45 MIN: CPT | Mod: S$GLB,,, | Performed by: PHYSICIAN ASSISTANT

## 2025-01-31 PROCEDURE — 99999 PR PBB SHADOW E&M-EST. PATIENT-LVL III: CPT | Mod: PBBFAC,,, | Performed by: PHYSICIAN ASSISTANT

## 2025-01-31 PROCEDURE — 1159F MED LIST DOCD IN RCRD: CPT | Mod: CPTII,S$GLB,, | Performed by: PHYSICIAN ASSISTANT

## 2025-01-31 PROCEDURE — 3008F BODY MASS INDEX DOCD: CPT | Mod: CPTII,S$GLB,, | Performed by: PHYSICIAN ASSISTANT

## 2025-01-31 RX ORDER — FLUTICASONE PROPIONATE 50 MCG
2 SPRAY, SUSPENSION (ML) NASAL DAILY
Qty: 16 ML | Refills: 1 | Status: SHIPPED | OUTPATIENT
Start: 2025-01-31

## 2025-01-31 RX ORDER — CEFDINIR 300 MG/1
300 CAPSULE ORAL EVERY 12 HOURS
Qty: 20 CAPSULE | Refills: 0 | Status: SHIPPED | OUTPATIENT
Start: 2025-01-31 | End: 2025-02-10

## 2025-01-31 NOTE — PROGRESS NOTES
"Subjective:   Patient ID: Alec Araya is a 19 y.o. female.    Chief Complaint: sore throat and recurrent sinus infections (Pt states since Aug has had a sinus infection every other month. C/o sore throat since Tues. C/o congestion and coughing up green mucus)    Patient is a pleasant 19 year old female presenting for recurrent sinus infections and sore throat. She states she gets sinus infections every other month since August 2024. She was treated with doxycycline on 8/30/24 and z pack on 11/18/24. She currently c/o congestion, purulent mucus, sore throat and ear pain (L>>R); present since Tuesday. States she has not taken her temperature but woke up last night sweating. Denies sinus surgery or sinus imaging. Denies use of saline rinses or consistent use of nasal sprays. Denies trouble swallowing.      Review of patient's allergies indicates:   Allergen Reactions    Penicillins            Review of Systems   Constitutional: Negative.    HENT:  Positive for congestion, ear pain, sore throat, tinnitus and trouble swallowing. Negative for ear discharge, hearing loss, postnasal drip, rhinorrhea, sinus pressure and sinus pain.    Eyes: Negative.  Negative for pain and discharge.   Respiratory: Negative.  Negative for apnea, shortness of breath and wheezing.    Cardiovascular: Negative.    Gastrointestinal: Negative.    Endocrine: Negative.    Genitourinary: Negative.    Musculoskeletal:  Positive for neck pain.   Skin: Negative.    Neurological:  Positive for headaches. Negative for dizziness.   Hematological: Negative.    Psychiatric/Behavioral: Negative.           Objective:   Ht 5' 7" (1.702 m)   Wt 93.8 kg (206 lb 12.7 oz)   BMI 32.39 kg/m²     Physical Exam  Constitutional:       General: She is not in acute distress.     Appearance: Normal appearance. She is not ill-appearing or toxic-appearing.   HENT:      Head: Normocephalic and atraumatic.      Right Ear: Tympanic membrane, ear canal and external ear " normal. Tympanic membrane is not erythematous or bulging.      Left Ear: Tympanic membrane, ear canal and external ear normal. Tympanic membrane is not erythematous or bulging.      Nose: Nose normal. No congestion or rhinorrhea.      Mouth/Throat:      Mouth: Mucous membranes are moist.      Pharynx: Oropharynx is clear. Uvula midline. No oropharyngeal exudate or posterior oropharyngeal erythema.      Tonsils: 3+ on the right. 3+ on the left.   Eyes:      Extraocular Movements: Extraocular movements intact.      Conjunctiva/sclera: Conjunctivae normal.      Pupils: Pupils are equal, round, and reactive to light.   Neck:      Thyroid: No thyroid mass.   Pulmonary:      Effort: Pulmonary effort is normal. No respiratory distress.   Musculoskeletal:         General: Normal range of motion.      Cervical back: Full passive range of motion without pain and normal range of motion.   Lymphadenopathy:      Cervical: No cervical adenopathy.   Neurological:      General: No focal deficit present.      Mental Status: She is alert and oriented to person, place, and time.   Psychiatric:         Mood and Affect: Mood normal.         Behavior: Behavior normal. Behavior is cooperative.         Thought Content: Thought content normal.         Judgment: Judgment normal.              Assessment:     1. Acute recurrent sinusitis, unspecified location        Plan:     Acute recurrent sinusitis, unspecified location  -     cefdinir (OMNICEF) 300 MG capsule; Take 1 capsule (300 mg total) by mouth every 12 (twelve) hours. for 10 days  Dispense: 20 capsule; Refill: 0  -     fluticasone propionate (FLONASE) 50 mcg/actuation nasal spray; 2 sprays (100 mcg total) by Each Nostril route once daily.  Dispense: 16 mL; Refill: 1      We discussed options for treatment including a course of antibiotics and imaging. Plan to complete 10 day course of Omnicef and I would like to see patient back in 2-3 weeks for reevaluation of symptoms. If at that  "time she is still symptomatic, I would proceed with imaging of her sinuses. I would also recommend the patient begin to use a steroid nasal spray, and we discussed in detail the proper mechanism of use directing the spray away from the nasal septum.  In addition, we also discussed that it will take two to three weeks of daily use to achieve maximal effectiveness.    She did mention difficulty swallowing large pills, so I would like her to please reach out if she is unable to take prescribed antibiotic.       Sinusitis is caused by inflammation creating blockage in the natural drainage pathways of the sinuses.  This "Sinus Protocol" is designed to open, flush out and decrease the inflammation within those pathways.      Day 1-3 (Perform 2x per day)     Afrin (pump spray mist OTC) 2 sprays in each nostril   [facility] Sinus Wash - Make sure you use distilled water!  Fluticasone nasal spray 2 sprays in each nostril      Days 4 - follow up visit (perform 1x per day)    [facility] Sinus wash  Fluticasone nasal spray 2 sprays in each nostril        *TAKE ALL OTHER MEDICATIONS AS DIRECTED   *MAKE SURE YOU STOP USING AFRIN AFTER THE 3RD DAY AS THERE IS A RISK OF BECOMING DEPENDENT ON THAT MEDICATION     "

## 2025-02-05 ENCOUNTER — LAB VISIT (OUTPATIENT)
Dept: LAB | Facility: OTHER | Age: 20
End: 2025-02-05
Attending: INTERNAL MEDICINE
Payer: COMMERCIAL

## 2025-02-05 DIAGNOSIS — Z29.81 ENCOUNTER FOR HIV PRE-EXPOSURE PROPHYLAXIS: ICD-10-CM

## 2025-02-05 LAB — HIV 1+2 AB+HIV1 P24 AG SERPL QL IA: NEGATIVE

## 2025-02-05 PROCEDURE — 87389 HIV-1 AG W/HIV-1&-2 AB AG IA: CPT | Performed by: INTERNAL MEDICINE

## 2025-02-05 PROCEDURE — 36415 COLL VENOUS BLD VENIPUNCTURE: CPT | Performed by: INTERNAL MEDICINE

## 2025-02-09 ENCOUNTER — PATIENT MESSAGE (OUTPATIENT)
Dept: OBSTETRICS AND GYNECOLOGY | Facility: CLINIC | Age: 20
End: 2025-02-09
Payer: COMMERCIAL

## 2025-03-04 ENCOUNTER — OFFICE VISIT (OUTPATIENT)
Dept: OTOLARYNGOLOGY | Facility: CLINIC | Age: 20
End: 2025-03-04
Payer: COMMERCIAL

## 2025-03-04 DIAGNOSIS — J01.91 ACUTE RECURRENT SINUSITIS, UNSPECIFIED LOCATION: Primary | ICD-10-CM

## 2025-03-04 PROCEDURE — 99999 PR PBB SHADOW E&M-EST. PATIENT-LVL III: CPT | Mod: PBBFAC,,,

## 2025-03-04 NOTE — PROGRESS NOTES
Subjective:   Patient ID: Alec Araya is a 19 y.o. female.    Chief Complaint: Sinus Problem (Pt has come in for for recheck sinus issues )    Patient is a pleasant 19 year old female presenting for recurrent sinus infections and sore throat. She states she gets sinus infections every other month since August 2024. She was treated with doxycycline on 8/30/24 and z pack on 11/18/24. She currently c/o congestion, purulent mucus, sore throat and ear pain (L>>R); present since Tuesday. States she has not taken her temperature but woke up last night sweating. Denies sinus surgery or sinus imaging. Denies use of saline rinses or consistent use of nasal sprays. Denies trouble swallowing.    3/4/25 update:   She was unable to finish Omnicef due to developing a yeast infection. She did not see improvement in symptoms when on abx. She has been using flonase consistently and finds that helpful. Only used sinus rinses a few times.  C/o productive cough & still having purulent drainage from nose and throat - states it is dark green and brown and occasionally red. Admits to frequent HA occurring behind the eyes. Denies fever or ear pain. She is not a smoker.       Review of patient's allergies indicates:   Allergen Reactions    Penicillins            Review of Systems   Constitutional: Negative.  Negative for fever.   HENT:  Positive for congestion, sinus pressure and sore throat. Negative for ear discharge, ear pain, hearing loss, postnasal drip, rhinorrhea, sinus pain, tinnitus and trouble swallowing.    Eyes: Negative.  Negative for pain and discharge.   Respiratory: Negative.  Negative for apnea, shortness of breath and wheezing.    Cardiovascular: Negative.    Gastrointestinal: Negative.    Endocrine: Negative.    Genitourinary: Negative.    Musculoskeletal:  Positive for neck pain.   Skin: Negative.    Allergic/Immunologic: Negative.    Neurological:  Positive for headaches. Negative for dizziness.   Hematological:  Negative.    Psychiatric/Behavioral: Negative.           Objective:   There were no vitals taken for this visit.    Physical Exam  Constitutional:       General: She is not in acute distress.     Appearance: Normal appearance. She is not ill-appearing or toxic-appearing.   HENT:      Head: Normocephalic and atraumatic.      Right Ear: Hearing, tympanic membrane, ear canal and external ear normal. Tympanic membrane is not erythematous or bulging.      Left Ear: Hearing, tympanic membrane, ear canal and external ear normal. Tympanic membrane is not erythematous or bulging.      Nose: Nose normal. No congestion or rhinorrhea.      Mouth/Throat:      Mouth: Mucous membranes are moist.      Pharynx: Oropharynx is clear. Uvula midline. No oropharyngeal exudate or posterior oropharyngeal erythema.      Tonsils: 3+ on the right. 3+ on the left.   Eyes:      Extraocular Movements: Extraocular movements intact.      Conjunctiva/sclera: Conjunctivae normal.      Pupils: Pupils are equal, round, and reactive to light.   Neck:      Thyroid: No thyroid mass.   Pulmonary:      Effort: Pulmonary effort is normal. No respiratory distress.   Musculoskeletal:         General: Normal range of motion.      Cervical back: Full passive range of motion without pain and normal range of motion.   Lymphadenopathy:      Cervical: No cervical adenopathy.   Neurological:      General: No focal deficit present.      Mental Status: She is alert and oriented to person, place, and time.   Psychiatric:         Mood and Affect: Mood normal.         Behavior: Behavior normal. Behavior is cooperative.         Thought Content: Thought content normal.         Judgment: Judgment normal.              Assessment:     1. Acute recurrent sinusitis, unspecified location          Plan:     Acute recurrent sinusitis, unspecified location  -     CT [facility] [employer] Sinuses without; Future; Expected date: 03/04/2025        We discussed options for treatment including a  "course of antibiotics and imaging. Plan to complete 10 day course of Omnicef and I would like to see patient back in 2-3 weeks for reevaluation of symptoms. If at that time she is still symptomatic, I would proceed with imaging of her sinuses. I would also recommend the patient begin to use a steroid nasal spray, and we discussed in detail the proper mechanism of use directing the spray away from the nasal septum.  In addition, we also discussed that it will take two to three weeks of daily use to achieve maximal effectiveness.    She did mention difficulty swallowing large pills, so I would like her to please reach out if she is unable to take prescribed antibiotic.     3/4/25 update: Plan to schedule CT sinus and follow up with ENT MD for scope evaluation & review CT. I recommend she continue Flonase and saline rinses. Return to clinic sooner for any worsening symptoms.       Sinusitis is caused by inflammation creating blockage in the natural drainage pathways of the sinuses.  This "Sinus Protocol" is designed to open, flush out and decrease the inflammation within those pathways.      Day 1-3 (Perform 2x per day)     Afrin (pump spray mist OTC) 2 sprays in each nostril   [facility] Sinus Wash - Make sure you use distilled water!  Fluticasone nasal spray 2 sprays in each nostril      Days 4 - follow up visit (perform 1x per day)    [facility] Sinus wash  Fluticasone nasal spray 2 sprays in each nostril        *TAKE ALL OTHER MEDICATIONS AS DIRECTED   *MAKE SURE YOU STOP USING AFRIN AFTER THE 3RD DAY AS THERE IS A RISK OF BECOMING DEPENDENT ON THAT MEDICATION       "

## 2025-03-07 ENCOUNTER — OFFICE VISIT (OUTPATIENT)
Dept: DERMATOLOGY | Facility: CLINIC | Age: 20
End: 2025-03-07
Payer: COMMERCIAL

## 2025-03-07 VITALS — WEIGHT: 206.81 LBS | BODY MASS INDEX: 32.46 KG/M2 | HEIGHT: 67 IN

## 2025-03-07 DIAGNOSIS — L70.0 ACNE VULGARIS: Primary | ICD-10-CM

## 2025-03-07 PROCEDURE — 99999 PR PBB SHADOW E&M-EST. PATIENT-LVL III: CPT | Mod: PBBFAC,,, | Performed by: STUDENT IN AN ORGANIZED HEALTH CARE EDUCATION/TRAINING PROGRAM

## 2025-03-07 RX ORDER — CLINDAMYCIN PHOSPHATE 10 MG/ML
SOLUTION TOPICAL DAILY
Qty: 60 EACH | Refills: 2 | Status: SHIPPED | OUTPATIENT
Start: 2025-03-07 | End: 2026-03-07

## 2025-03-07 RX ORDER — TRETINOIN 0.25 MG/G
CREAM TOPICAL NIGHTLY
Qty: 45 G | Refills: 6 | Status: SHIPPED | OUTPATIENT
Start: 2025-03-07

## 2025-03-07 NOTE — PATIENT INSTRUCTIONS

## 2025-03-07 NOTE — PROGRESS NOTES
Subjective:       Patient ID:  Alec Araya is a 19 y.o. female who presents for   Chief Complaint   Patient presents with    Acne     Pt has come in for an ance f/u        History of Present Illness: The patient presents for follow up of acne, last seen on 6/13/24, using tretinoin and clindamycin topically. Reports that symptoms of acne are well controlled with clear skin and no recent flares. Tolerating medication well; needs refills.       Acne        Review of Systems   Constitutional:  Negative for fever and chills.   Skin:  Negative for itching, rash and dry skin.        Objective:    Physical Exam   Constitutional: She appears well-developed and well-nourished. No distress.   Neurological: She is alert and oriented to person, place, and time. She is not disoriented.   Psychiatric: She has a normal mood and affect.   Skin:   Areas Examined (abnormalities noted in diagram):   Head / Face Inspection Performed  Neck Inspection Performed              Diagram Legend     Erythematous scaling macule/papule c/w actinic keratosis       Vascular papule c/w angioma      Pigmented verrucoid papule/plaque c/w seborrheic keratosis      Yellow umbilicated papule c/w sebaceous hyperplasia      Irregularly shaped tan macule c/w lentigo     1-2 mm smooth white papules consistent with Milia      Movable subcutaneous cyst with punctum c/w epidermal inclusion cyst      Subcutaneous movable cyst c/w pilar cyst      Firm pink to brown papule c/w dermatofibroma      Pedunculated fleshy papule(s) c/w skin tag(s)      Evenly pigmented macule c/w junctional nevus     Mildly variegated pigmented, slightly irregular-bordered macule c/w mildly atypical nevus      Flesh colored to evenly pigmented papule c/w intradermal nevus       Pink pearly papule/plaque c/w basal cell carcinoma      Erythematous hyperkeratotic cursted plaque c/w SCC      Surgical scar with no sign of skin cancer recurrence      Open and closed comedones       Inflammatory papules and pustules      Verrucoid papule consistent consistent with wart     Erythematous eczematous patches and plaques     Dystrophic onycholytic nail with subungual debris c/w onychomycosis     Umbilicated papule    Erythematous-base heme-crusted tan verrucoid plaque consistent with inflamed seborrheic keratosis     Erythematous Silvery Scaling Plaque c/w Psoriasis     See annotation      Assessment / Plan:        Acne vulgaris - symptoms controlled with topicals. Will continue at this time.   -     clindamycin (CLEOCIN T) 1 % Swab; Apply topically once daily.  Dispense: 60 each; Refill: 2  -     tretinoin (RETIN-A) 0.025 % cream; Apply topically every evening.  Dispense: 45 g; Refill: 6             Follow up in about 1 year (around 3/7/2026).

## 2025-03-21 ENCOUNTER — HOSPITAL ENCOUNTER (OUTPATIENT)
Dept: RADIOLOGY | Facility: HOSPITAL | Age: 20
Discharge: HOME | End: 2025-03-21
Payer: COMMERCIAL

## 2025-03-21 ENCOUNTER — PATIENT MESSAGE (OUTPATIENT)
Dept: OTOLARYNGOLOGY | Facility: CLINIC | Age: 20
End: 2025-03-21

## 2025-03-21 ENCOUNTER — OFFICE VISIT (OUTPATIENT)
Dept: OTOLARYNGOLOGY | Facility: CLINIC | Age: 20
End: 2025-03-21
Payer: COMMERCIAL

## 2025-03-21 VITALS — BODY MASS INDEX: 32.39 KG/M2 | HEIGHT: 67 IN

## 2025-03-21 DIAGNOSIS — J35.3 ADENOTONSILLAR HYPERTROPHY: ICD-10-CM

## 2025-03-21 DIAGNOSIS — J01.91 ACUTE RECURRENT SINUSITIS, UNSPECIFIED LOCATION: ICD-10-CM

## 2025-03-21 DIAGNOSIS — J35.01 CHRONIC TONSILLITIS: ICD-10-CM

## 2025-03-21 DIAGNOSIS — R51.9 NONINTRACTABLE HEADACHE, UNSPECIFIED CHRONICITY PATTERN, UNSPECIFIED HEADACHE TYPE: ICD-10-CM

## 2025-03-21 DIAGNOSIS — K21.9 LARYNGOPHARYNGEAL REFLUX (LPR): Primary | ICD-10-CM

## 2025-03-21 PROCEDURE — 70486 CT MAXILLOFACIAL W/O DYE: CPT | Mod: 26,,, | Performed by: RADIOLOGY

## 2025-03-21 PROCEDURE — 99999 PR PBB SHADOW E&M-EST. PATIENT-LVL III: CPT | Mod: PBBFAC,,, | Performed by: STUDENT IN AN ORGANIZED HEALTH CARE EDUCATION/TRAINING PROGRAM

## 2025-03-21 PROCEDURE — 70486 CT MAXILLOFACIAL W/O DYE: CPT | Mod: TC

## 2025-03-21 RX ORDER — PANTOPRAZOLE SODIUM 40 MG/1
40 TABLET, DELAYED RELEASE ORAL DAILY
Qty: 90 TABLET | Refills: 11 | Status: SHIPPED | OUTPATIENT
Start: 2025-03-21 | End: 2026-03-21

## 2025-03-21 NOTE — PROGRESS NOTES
"Subjective:   Patient ID: Alec Araya is a 19 y.o. female.    Chief Complaint: Follow-up (Follow up per Lissette)    Patient is a pleasant 19 year old female presenting for recurrent sinus infections and sore throat. She states she gets sinus infections every other month since August 2024. She was treated with doxycycline on 8/30/24 and z pack on 11/18/24. She currently c/o congestion, purulent mucus, sore throat and ear pain (L>>R); present since Tuesday. States she has not taken her temperature but woke up last night sweating. Denies sinus surgery or sinus imaging. Denies use of saline rinses or consistent use of nasal sprays. Denies trouble swallowing.    3/4/25 update:   She was unable to finish Omnicef due to developing a yeast infection. She did not see improvement in symptoms when on abx. She has been using flonase consistently and finds that helpful. Only used sinus rinses a few times.  C/o productive cough & still having purulent drainage from nose and throat - states it is dark green and brown and occasionally red. Admits to frequent HA occurring behind the eyes. Denies fever or ear pain. She is not a smoker.     3/21/25 update:    C/o dysphagia, productive cough, discolored drainage from nose and throat - states it is dark green and brown and occasionally red. Admits to frequent HA occurring behind the eyes. Denies fever or ear pain.    She does get heart burn  Still coughing up mucus in the mornings    Getting sick 3-4 times a year, usually with sore throat, enlarged tonsils  Multiple rounds of abx    On zrytec, claritin, flonase nasal sprays      Review of patient's allergies indicates:   Allergen Reactions    Penicillins                  Objective:   Ht 5' 7" (1.702 m)   BMI 32.39 kg/m²     Physical Exam  Constitutional:       General: She is not in acute distress.     Appearance: Normal appearance. She is not ill-appearing or toxic-appearing.   HENT:      Head: Normocephalic and atraumatic.      " Right Ear: Hearing, tympanic membrane, ear canal and external ear normal. Tympanic membrane is not erythematous or bulging.      Left Ear: Hearing, tympanic membrane, ear canal and external ear normal. Tympanic membrane is not erythematous or bulging.      Nose: Nose normal. No congestion or rhinorrhea.      Mouth/Throat:      Mouth: Mucous membranes are moist.      Pharynx: Oropharynx is clear. Uvula midline. No oropharyngeal exudate or posterior oropharyngeal erythema.      Tonsils: 3+ on the right. 3+ on the left.   Eyes:      Extraocular Movements: Extraocular movements intact.      Conjunctiva/sclera: Conjunctivae normal.      Pupils: Pupils are equal, round, and reactive to light.   Neck:      Thyroid: No thyroid mass.   Pulmonary:      Effort: Pulmonary effort is normal. No respiratory distress.   Musculoskeletal:         General: Normal range of motion.      Cervical back: Full passive range of motion without pain and normal range of motion.   Lymphadenopathy:      Cervical: No cervical adenopathy.   Neurological:      General: No focal deficit present.      Mental Status: She is alert and oriented to person, place, and time.   Psychiatric:         Mood and Affect: Mood normal.         Behavior: Behavior normal. Behavior is cooperative.         Thought Content: Thought content normal.         Judgment: Judgment normal.            Data review  Images    I have independently reviewed the following imaging:    CT sinus 3/21/25  Clear paranasal sinuses  S shaped septal deviation  Inferior turbinate hypertrophy worse on the right       Procedure -Transnasal fiberoptic laryngoscopy     Surgeon: True Rodríguez M.D. .      Anesthesia: topical 0.05% oxymetazoline with 4% lidocaine      Complications: None.     Description of Procedure: With the patient in the sitting position, topical lidocaine and oxymetazoline was applied to the nose. The scope was passed through the nose. Examination was carried out of the nose,  nasopharynx, oropharynx, hypopharynx, and larynx with findings as noted below. Scope was removed. The patient tolerated the procedure well.      Findings: No masses or lesions in the nose, nasopharynx, oropharynx, hypopharynx, or larynx. Vocal fold abduction and adduction is normal. No pooling of secretions in the piriform sinuses, penetration, or aspiration.       Severe adenoid hypertrophy  Narrow oropharynx due to tonsil enlargement          Assessment:     1. Laryngopharyngeal reflux (LPR)    2. Nonintractable headache, unspecified chronicity pattern, unspecified headache type    3. Chronic tonsillitis    4. Adenotonsillar hypertrophy            Plan:     Laryngopharyngeal reflux (LPR)    Nonintractable headache, unspecified chronicity pattern, unspecified headache type    Chronic tonsillitis    Adenotonsillar hypertrophy          We discussed options for treatment including a course of antibiotics and imaging. Plan to complete 10 day course of Omnicef and I would like to see patient back in 2-3 weeks for reevaluation of symptoms. If at that time she is still symptomatic, I would proceed with imaging of her sinuses. I would also recommend the patient begin to use a steroid nasal spray, and we discussed in detail the proper mechanism of use directing the spray away from the nasal septum.  In addition, we also discussed that it will take two to three weeks of daily use to achieve maximal effectiveness.    She did mention difficulty swallowing large pills, so I would like her to please reach out if she is unable to take prescribed antibiotic.     3/4/25 update: Plan to schedule CT sinus and follow up with ENT MD for scope evaluation & review CT. I recommend she continue Flonase and saline rinses. Return to clinic sooner for any worsening symptoms.     3/21/25 update:  Possibly some Laryngopharyngeal Reflux based on history; empiric treatment with PPI x 2 months, lifestyle and diet measures; if minimal improvement  then would consider tonsillectomy    Alec has evidence of chronic tonsillitis and tonsillar hypertrophy.  We discussed medical and surgical options and the patient has elected to proceed with tonsillectomy.  We discussed the goal of decreasing likelihood of future throat infections, but does not necessarily eliminate them.  We also discussed the risks and benefits of tonsillectomy including post-operative pain, dehydration, bleeding, soft palate swelling with/without palate dysfunction or nasal regurgitation, and persistence of symptoms.

## 2025-03-28 ENCOUNTER — PATIENT MESSAGE (OUTPATIENT)
Dept: OBSTETRICS AND GYNECOLOGY | Facility: CLINIC | Age: 20
End: 2025-03-28

## 2025-03-28 ENCOUNTER — RESULTS FOLLOW-UP (OUTPATIENT)
Dept: OBSTETRICS AND GYNECOLOGY | Facility: CLINIC | Age: 20
End: 2025-03-28

## 2025-03-28 ENCOUNTER — LAB VISIT (OUTPATIENT)
Dept: LAB | Facility: HOSPITAL | Age: 20
End: 2025-03-28
Payer: COMMERCIAL

## 2025-03-28 ENCOUNTER — OFFICE VISIT (OUTPATIENT)
Dept: OBSTETRICS AND GYNECOLOGY | Facility: CLINIC | Age: 20
End: 2025-03-28
Payer: COMMERCIAL

## 2025-03-28 VITALS
DIASTOLIC BLOOD PRESSURE: 86 MMHG | BODY MASS INDEX: 45.99 KG/M2 | WEIGHT: 293 LBS | HEIGHT: 67 IN | SYSTOLIC BLOOD PRESSURE: 126 MMHG

## 2025-03-28 DIAGNOSIS — Z11.3 ROUTINE SCREENING FOR STI (SEXUALLY TRANSMITTED INFECTION): ICD-10-CM

## 2025-03-28 DIAGNOSIS — Z11.3 ROUTINE SCREENING FOR STI (SEXUALLY TRANSMITTED INFECTION): Primary | ICD-10-CM

## 2025-03-28 DIAGNOSIS — Z30.011 ENCOUNTER FOR INITIAL PRESCRIPTION OF CONTRACEPTIVE PILLS: ICD-10-CM

## 2025-03-28 DIAGNOSIS — N92.1 BREAKTHROUGH BLEEDING ON BIRTH CONTROL PILLS: ICD-10-CM

## 2025-03-28 LAB
B-HCG UR QL: NEGATIVE
CTP QC/QA: YES
HBV SURFACE AG SERPL QL IA: NORMAL
HCV AB SERPL QL IA: NORMAL
HIV 1+2 AB+HIV1 P24 AG SERPL QL IA: NORMAL
T PALLIDUM IGG+IGM SER QL: NEGATIVE

## 2025-03-28 PROCEDURE — 99999 PR PBB SHADOW E&M-EST. PATIENT-LVL III: CPT | Mod: PBBFAC,,, | Performed by: NURSE PRACTITIONER

## 2025-03-28 PROCEDURE — 87389 HIV-1 AG W/HIV-1&-2 AB AG IA: CPT

## 2025-03-28 PROCEDURE — 86803 HEPATITIS C AB TEST: CPT

## 2025-03-28 PROCEDURE — 36415 COLL VENOUS BLD VENIPUNCTURE: CPT

## 2025-03-28 PROCEDURE — 86593 SYPHILIS TEST NON-TREP QUANT: CPT

## 2025-03-28 PROCEDURE — 87340 HEPATITIS B SURFACE AG IA: CPT

## 2025-03-28 RX ORDER — NORETHINDRONE ACETATE AND ETHINYL ESTRADIOL 1.5-30(21)
1 KIT ORAL DAILY
Qty: 84 TABLET | Refills: 5 | Status: CANCELLED | OUTPATIENT
Start: 2025-03-28 | End: 2026-03-28

## 2025-03-28 RX ORDER — DROSPIRENONE 4 MG/1
4 TABLET, FILM COATED ORAL DAILY
Qty: 84 TABLET | Refills: 5 | Status: SHIPPED | OUTPATIENT
Start: 2025-03-28 | End: 2026-03-28

## 2025-03-28 NOTE — PROGRESS NOTES
"Chief Complaint: BTB w/ OCPs     HPI:      Alec is a 19 y.o.  who presents today for BTB w/ OCPs. She was initially started on Mary OCPs in 2024. She was switched to Ivelisse OCPs in 2024 d/t bothersome BTB. BTB has persisted. She remembers to take OCPs daily. She will notice BTB on /3rd week of the pill packet. Bleeding will last up to 7 days w/ cramping. She does not typically bleed on the placebo week. She is also here for routine STI screening. She denies any vaginal/urinary concerns or pelvic pain.    Menses are irregular w/ OCPs. Patient's last menstrual period was 2025 (exact date).     Alec  is currently sexually active . She is currently using oral contraceptives (estrogen/progesterone) for contraception.   drospirenone-ethinyl estradioL (IVELISSE, 28,) 3-0.03 mg per tablet         Previous Pap: LSIL (2024)     Gardasil:Completed     Past Medical History:   Diagnosis Date    ADHD      Current Medications[1]   Review of patient's allergies indicates:   Allergen Reactions    Penicillins      History reviewed. No pertinent surgical history.  Social History[2]  Family History   Problem Relation Name Age of Onset    Hypertension Paternal Grandmother      Diabetes Maternal Grandmother      Hypertension Maternal Grandmother      Hyperlipidemia Maternal Grandfather      Prostate cancer Maternal Grandfather      Hypertension Father      Eczema Mother      Asthma Mother         Physical Exam:      PHYSICAL EXAM:  /86   Ht 5' 7.01" (1.702 m)   Wt (!) 166 kg (365 lb 15.4 oz)   LMP 2025 (Exact Date)   BMI 57.30 kg/m²   Body mass index is 57.3 kg/m².     APPEARANCE: Well nourished, well developed, in no acute distress.    Assessment/Plan:     Routine screening for STI (sexually transmitted infection)  -     C. trachomatis/N. gonorrhoeae by AMP DNA [facility]; Vagina  -     Hepatitis B Surface Antigen; Future; Expected date: 2025  -     Hepatitis C Antibody; Future; Expected " date: 03/28/2025  -     HIV 1/2 Ag/Ab (4th Gen); Future; Expected date: 03/28/2025  -     Treponema Pallidium Antibodies IgG, IgM; Future; Expected date: 03/28/2025    Breakthrough bleeding on birth control pills  -     POCT Urine Pregnancy  -     C. trachomatis/N. gonorrhoeae by AMP DNA [facility]; Vagina  -     drospirenone, contraceptive, (SLYND) 4 mg (28) Tab; Take 1 tablet (4 mg total) by mouth once daily. Skip placebo week to suppress menses  Dispense: 84 tablet; Refill: 5    Encounter for initial prescription of contraceptive pills  -     drospirenone, contraceptive, (SLYND) 4 mg (28) Tab; Take 1 tablet (4 mg total) by mouth once daily. Skip placebo week to suppress menses  Dispense: 84 tablet; Refill: 5        PLAN:    GC/CT self-collected. Serum STI screening ordered.   Will switch to drospirenone, contraceptive, (SLYND) 4 mg (28) POPs to see if this provides relief from BTB with current RAZIA.   Will consider extended or continuous cycling of POPs to suppress menses/provide relief from dysmenorrhea.   Will consider further evaluation/underlying etiology if BTB persists past 3-6 months of starting new POP.    Follow up in about 3 months (around 6/28/2025) for BTB/POP follow-up.                 [1]   Current Outpatient Medications:     acyclovir (ZOVIRAX) 400 MG tablet, Take 1 tablet (400 mg total) by mouth 2 (two) times daily., Disp: 60 tablet, Rfl: 11    ARIPiprazole (ABILIFY) 10 MG Tab, Take 10 mg by mouth., Disp: , Rfl:     clindamycin (CLEOCIN T) 1 % external solution, Apply topically once daily., Disp: 60 mL, Rfl: 3    clindamycin (CLEOCIN T) 1 % Swab, Apply topically once daily., Disp: 60 each, Rfl: 2    FLUoxetine 20 MG capsule, Take 20 mg by mouth., Disp: , Rfl:     fluticasone propionate (FLONASE) 50 mcg/actuation nasal spray, 2 sprays (100 mcg total) by Each Nostril route once daily., Disp: 16 mL, Rfl: 1    pantoprazole (PROTONIX) 40 MG tablet, Take 1 tablet (40 mg total) by mouth once daily., Disp: 90  tablet, Rfl: 11    tretinoin (RETIN-A) 0.025 % cream, Apply topically every evening., Disp: 45 g, Rfl: 3    tretinoin (RETIN-A) 0.025 % cream, Apply topically every evening., Disp: 45 g, Rfl: 6    drospirenone, contraceptive, (SLYND) 4 mg (28) Tab, Take 1 tablet (4 mg total) by mouth once daily. Skip placebo week to suppress menses, Disp: 84 tablet, Rfl: 5  [2]   Social History  Tobacco Use    Smoking status: Never    Smokeless tobacco: Never   Substance Use Topics    Alcohol use: Never    Drug use: Yes     Types: Marijuana

## 2025-04-09 ENCOUNTER — PATIENT MESSAGE (OUTPATIENT)
Dept: OBSTETRICS AND GYNECOLOGY | Facility: CLINIC | Age: 20
End: 2025-04-09
Payer: COMMERCIAL

## 2025-05-12 ENCOUNTER — PATIENT MESSAGE (OUTPATIENT)
Dept: OBSTETRICS AND GYNECOLOGY | Facility: CLINIC | Age: 20
End: 2025-05-12
Payer: COMMERCIAL

## 2025-05-20 ENCOUNTER — OFFICE VISIT (OUTPATIENT)
Dept: INFECTIOUS DISEASES | Facility: CLINIC | Age: 20
End: 2025-05-20
Payer: COMMERCIAL

## 2025-05-20 DIAGNOSIS — Z29.81 ENCOUNTER FOR HIV PRE-EXPOSURE PROPHYLAXIS: Primary | ICD-10-CM

## 2025-05-20 PROCEDURE — 98006 SYNCH AUDIO-VIDEO EST MOD 30: CPT | Mod: 95,,, | Performed by: INTERNAL MEDICINE

## 2025-05-20 NOTE — PROGRESS NOTES
Subjective:    THIS IS A TELE INFECTIOUS DISEASE VISIT  Location- [place]  Audio and video connection used  Time spent 15 minutes     Patient ID: Alec Araya is a 19 y.o. female.    Chief Complaint:  Encounter for initiation of prep  HPI  19-year-old woman who presented to discuss initiation of parenteral prep she reports that she is unable to swallow pills.  She has tried previous oral medications but had difficulty with swelling the medications with abdominal pain.  She would like to try injectable prep options  Past Medical History:   Diagnosis Date    ADHD      No past surgical history on file.  Family History   Problem Relation Name Age of Onset    Hypertension Paternal Grandmother      Diabetes Maternal Grandmother      Hypertension Maternal Grandmother      Hyperlipidemia Maternal Grandfather      Prostate cancer Maternal Grandfather      Hypertension Father      Eczema Mother      Asthma Mother       Social History[1]      Review of Systems   Constitutional:  Negative for activity change, appetite change, chills and diaphoresis.   Respiratory:  Negative for apnea and chest tightness.    Neurological:  Negative for dizziness and facial asymmetry.       Objective:      Physical Exam  Vitals and nursing note reviewed.   Musculoskeletal:      Cervical back: Normal range of motion.   Neurological:      Mental Status: She is alert.         Assessment:       1. Encounter for HIV pre-exposure prophylaxis              Plan:     1. Encounter for HIV pre-exposure prophylaxis  Assessment & Plan:  She is unable to tolerate oral medications for prep.  She is heterosexual.  We will plan to try IV Cabotegravir is an antiretroviral drug used to treat and prevent HIV-1 infection. It belongs to a class of medications called HIV integrase inhibitors.     Recommended Every-2-Month Dosing Schedule:   Outpatient Antibiotic Therapy Plan:     Please send referral to [facility].     1) Infection:  PREP     2)  Discharge Antibiotics:     Initiate injections of CABENUVA (600 mg of cabotegravir and 900 mg of rilpivirine)  2 consecutive months and continue with injections of CABENUVA every 2 months .     3) Therapy Duration:       Estimated end date of IV antibiotics:      4) Outpatient Weekly Labs:     We will need to send HIV RNA prior to injection , we will also send scheduled CMP every 4 months    Orders:  -     HIV RNA, Quantitative, PCR; Standing  -     HIV RNA, Quantitative, PCR; Standing  -     Comprehensive Metabolic Panel; Standing                  [1]   Social History  Socioeconomic History    Marital status: Single   Tobacco Use    Smoking status: Never    Smokeless tobacco: Never   Substance and Sexual Activity    Alcohol use: Never    Drug use: Yes     Types: Marijuana    Sexual activity: Never     Social Drivers of Health     Financial Resource Strain: Low Risk  (3/28/2025)    Overall Financial Resource Strain (CARDIA)     Difficulty of Paying Living Expenses: Not hard at all   Food Insecurity: No Food Insecurity (3/28/2025)    Hunger Vital Sign     Worried About Running Out of Food in the Last Year: Never true     Ran Out of Food in the Last Year: Never true   Transportation Needs: No Transportation Needs (3/28/2025)    PRAPARE - Transportation     Lack of Transportation (Medical): No     Lack of Transportation (Non-Medical): No   Physical Activity: Unknown (3/28/2025)    Exercise Vital Sign     Days of Exercise per Week: 0 days   Stress: No Stress Concern Present (3/28/2025)    [employer] [place] [employer] - Occupational Stress Questionnaire     Feeling of Stress : Only a little   Housing Stability: Low Risk  (3/28/2025)    Housing Stability Vital Sign     Unable to Pay for Housing in the Last Year: No     Homeless in the Last Year: No

## 2025-05-20 NOTE — ASSESSMENT & PLAN NOTE
She is unable to tolerate oral medications for prep.  She is heterosexual.  We will plan to try IV Cabotegravir is an antiretroviral drug used to treat and prevent HIV-1 infection. It belongs to a class of medications called HIV integrase inhibitors.     Recommended Every-2-Month Dosing Schedule:   Outpatient Antibiotic Therapy Plan:     Please send referral to [facility].     1) Infection:  PREP     2) Discharge Antibiotics:     Initiate injections of CABENUVA (600 mg of cabotegravir and 900 mg of rilpivirine)  2 consecutive months and continue with injections of CABENUVA every 2 months .     3) Therapy Duration:       Estimated end date of IV antibiotics:      4) Outpatient Weekly Labs:     We will need to send HIV RNA prior to injection , we will also send scheduled CMP every 4 months

## 2025-05-23 ENCOUNTER — OFFICE VISIT (OUTPATIENT)
Dept: OTOLARYNGOLOGY | Facility: CLINIC | Age: 20
End: 2025-05-23
Payer: COMMERCIAL

## 2025-05-23 ENCOUNTER — LAB VISIT (OUTPATIENT)
Dept: LAB | Facility: HOSPITAL | Age: 20
End: 2025-05-23
Attending: INTERNAL MEDICINE
Payer: COMMERCIAL

## 2025-05-23 VITALS — HEIGHT: 67 IN | WEIGHT: 293 LBS | BODY MASS INDEX: 45.99 KG/M2

## 2025-05-23 DIAGNOSIS — Z29.81 ENCOUNTER FOR HIV PRE-EXPOSURE PROPHYLAXIS: ICD-10-CM

## 2025-05-23 DIAGNOSIS — J01.91 ACUTE RECURRENT SINUSITIS, UNSPECIFIED LOCATION: ICD-10-CM

## 2025-05-23 DIAGNOSIS — J30.9 ALLERGIC RHINITIS, UNSPECIFIED SEASONALITY, UNSPECIFIED TRIGGER: ICD-10-CM

## 2025-05-23 DIAGNOSIS — K21.9 LARYNGOPHARYNGEAL REFLUX (LPR): Primary | ICD-10-CM

## 2025-05-23 DIAGNOSIS — J35.01 CHRONIC TONSILLITIS: ICD-10-CM

## 2025-05-23 LAB
ALBUMIN SERPL BCP-MCNC: 3.2 G/DL (ref 3.5–5.2)
ALP SERPL-CCNC: 92 UNIT/L (ref 40–150)
ALT SERPL W/O P-5'-P-CCNC: 11 UNIT/L (ref 10–44)
ANION GAP (OHS): 7 MMOL/L (ref 8–16)
AST SERPL-CCNC: 16 UNIT/L (ref 11–45)
BILIRUB SERPL-MCNC: 0.3 MG/DL (ref 0.1–1)
BUN SERPL-MCNC: 13 MG/DL (ref 6–20)
CALCIUM SERPL-MCNC: 9.2 MG/DL (ref 8.7–10.5)
CHLORIDE SERPL-SCNC: 108 MMOL/L (ref 95–110)
CO2 SERPL-SCNC: 22 MMOL/L (ref 23–29)
CREAT SERPL-MCNC: 0.8 MG/DL (ref 0.5–1.4)
GFR SERPLBLD CREATININE-BSD FMLA CKD-EPI: >60 ML/MIN/1.73/M2
GLUCOSE SERPL-MCNC: 83 MG/DL (ref 70–110)
POTASSIUM SERPL-SCNC: 4.3 MMOL/L (ref 3.5–5.1)
PROT SERPL-MCNC: 7.6 GM/DL (ref 6–8.4)
SODIUM SERPL-SCNC: 137 MMOL/L (ref 136–145)

## 2025-05-23 PROCEDURE — 87536 HIV-1 QUANT&REVRSE TRNSCRPJ: CPT

## 2025-05-23 PROCEDURE — 36415 COLL VENOUS BLD VENIPUNCTURE: CPT

## 2025-05-23 PROCEDURE — 99999 PR PBB SHADOW E&M-EST. PATIENT-LVL III: CPT | Mod: PBBFAC,,, | Performed by: STUDENT IN AN ORGANIZED HEALTH CARE EDUCATION/TRAINING PROGRAM

## 2025-05-23 PROCEDURE — 80053 COMPREHEN METABOLIC PANEL: CPT

## 2025-05-23 RX ORDER — FLUTICASONE PROPIONATE 50 MCG
2 SPRAY, SUSPENSION (ML) NASAL DAILY
Qty: 16 ML | Refills: 1 | Status: SHIPPED | OUTPATIENT
Start: 2025-05-23

## 2025-05-23 NOTE — PROGRESS NOTES
"Subjective:   Patient ID: Alec Araya is a 19 y.o. female.    Chief Complaint: Follow-up (Pt is coming in today for 2 month f/u LPR.)    Patient is a pleasant 19 year old female presenting for recurrent sinus infections and sore throat. She states she gets sinus infections every other month since August 2024. She was treated with doxycycline on 8/30/24 and z pack on 11/18/24. She currently c/o congestion, purulent mucus, sore throat and ear pain (L>>R); present since Tuesday. States she has not taken her temperature but woke up last night sweating. Denies sinus surgery or sinus imaging. Denies use of saline rinses or consistent use of nasal sprays. Denies trouble swallowing.    3/4/25 update:   She was unable to finish Omnicef due to developing a yeast infection. She did not see improvement in symptoms when on abx. She has been using flonase consistently and finds that helpful. Only used sinus rinses a few times.  C/o productive cough & still having purulent drainage from nose and throat - states it is dark green and brown and occasionally red. Admits to frequent HA occurring behind the eyes. Denies fever or ear pain. She is not a smoker.     3/21/25 update:  C/o dysphagia, productive cough, discolored drainage from nose and throat - states it is dark green and brown and occasionally red. Admits to frequent HA occurring behind the eyes. Denies fever or ear pain.    She does get heart burn  Still coughing up mucus in the mornings    Getting sick 3-4 times a year, usually with sore throat, enlarged tonsils  Multiple rounds of abx    On zrytec, claritin, flonase nasal sprays      5/23/25 update:    Improved dysphagia, cough, mucus in throat with PPI  No snoring, apnea, daytime somnolence  She is sneezing, itchy watery eyes  Continues on claritin, occasional use of flonase        Review of patient's allergies indicates:   Allergen Reactions    Penicillins                  Objective:   Ht 5' 7" (1.702 m)   Wt (!) " 161.8 kg (356 lb 11.3 oz)   BMI 55.87 kg/m²     Physical Exam  Constitutional:       General: She is not in acute distress.     Appearance: Normal appearance. She is not ill-appearing or toxic-appearing.   HENT:      Head: Normocephalic and atraumatic.      Right Ear: Hearing, tympanic membrane, ear canal and external ear normal. Tympanic membrane is not erythematous or bulging.      Left Ear: Hearing, tympanic membrane, ear canal and external ear normal. Tympanic membrane is not erythematous or bulging.      Nose: Nose normal. No congestion or rhinorrhea.      Mouth/Throat:      Mouth: Mucous membranes are moist.      Pharynx: Oropharynx is clear. Uvula midline. No oropharyngeal exudate or posterior oropharyngeal erythema.      Tonsils: 3+ on the right. 3+ on the left.   Eyes:      Extraocular Movements: Extraocular movements intact.      Conjunctiva/sclera: Conjunctivae normal.      Pupils: Pupils are equal, round, and reactive to light.   Neck:      Thyroid: No thyroid mass.   Pulmonary:      Effort: Pulmonary effort is normal. No respiratory distress.   Musculoskeletal:         General: Normal range of motion.      Cervical back: Full passive range of motion without pain and normal range of motion.   Lymphadenopathy:      Cervical: No cervical adenopathy.   Neurological:      General: No focal deficit present.      Mental Status: She is alert and oriented to person, place, and time.   Psychiatric:         Mood and Affect: Mood normal.         Behavior: Behavior normal. Behavior is cooperative.         Thought Content: Thought content normal.         Judgment: Judgment normal.            Data review  Images    I have independently reviewed the following imaging:    CT sinus 3/21/25  Clear paranasal sinuses  S shaped septal deviation  Inferior turbinate hypertrophy worse on the right       Procedure -Transnasal fiberoptic laryngoscopy     Surgeon: True Rodríguez M.D. .      Anesthesia: topical 0.05% oxymetazoline  with 4% lidocaine      Complications: None.     Description of Procedure: With the patient in the sitting position, topical lidocaine and oxymetazoline was applied to the nose. The scope was passed through the nose. Examination was carried out of the nose, nasopharynx, oropharynx, hypopharynx, and larynx with findings as noted below. Scope was removed. The patient tolerated the procedure well.      Findings: No masses or lesions in the nose, nasopharynx, oropharynx, hypopharynx, or larynx. Vocal fold abduction and adduction is normal. No pooling of secretions in the piriform sinuses, penetration, or aspiration.       moderate adenoid hypertrophy  Narrow oropharynx due to tissue redundancy and tonsil enlargement          Assessment:     1. Laryngopharyngeal reflux (LPR)    2. Chronic tonsillitis    3. Allergic rhinitis, unspecified seasonality, unspecified trigger    4. Acute recurrent sinusitis, unspecified location              Plan:     Laryngopharyngeal reflux (LPR)    Chronic tonsillitis    Allergic rhinitis, unspecified seasonality, unspecified trigger    Acute recurrent sinusitis, unspecified location        Symptoms improved with tx for Laryngopharyngeal Reflux  She does still have enlarged tonsils and adenoids, but she is not having signifiacnt nasal obstruction or sleep disordered braething  She does still have under controlled allergies and I recommend consisitent use of flonase and oral antihistamine  She will wean off PPI over the coming weeks  Return to clinic as needed

## 2025-05-26 LAB — HIV1 RNA SERPL NAA+PROBE-LOG#: NOT DETECTED {LOG_COPIES}/ML

## 2025-06-03 ENCOUNTER — LAB VISIT (OUTPATIENT)
Dept: LAB | Facility: HOSPITAL | Age: 20
End: 2025-06-03
Payer: COMMERCIAL

## 2025-06-03 ENCOUNTER — OFFICE VISIT (OUTPATIENT)
Dept: OBSTETRICS AND GYNECOLOGY | Facility: CLINIC | Age: 20
End: 2025-06-03
Payer: COMMERCIAL

## 2025-06-03 ENCOUNTER — RESULTS FOLLOW-UP (OUTPATIENT)
Dept: OBSTETRICS AND GYNECOLOGY | Facility: CLINIC | Age: 20
End: 2025-06-03

## 2025-06-03 VITALS
BODY MASS INDEX: 45.99 KG/M2 | WEIGHT: 293 LBS | SYSTOLIC BLOOD PRESSURE: 122 MMHG | DIASTOLIC BLOOD PRESSURE: 84 MMHG | HEIGHT: 67 IN

## 2025-06-03 DIAGNOSIS — Z11.3 ROUTINE SCREENING FOR STI (SEXUALLY TRANSMITTED INFECTION): ICD-10-CM

## 2025-06-03 DIAGNOSIS — N76.0 ACUTE VAGINITIS: Primary | ICD-10-CM

## 2025-06-03 DIAGNOSIS — Z13.29 SCREENING FOR ENDOCRINE DISORDER: ICD-10-CM

## 2025-06-03 LAB
BACTERIAL VAGINOSIS DNA (OHS): NOT DETECTED
C TRACH DNA SPEC QL NAA+PROBE: NOT DETECTED
CANDIDA GLABRATA/KRUSEI DNA (OHS): NOT DETECTED
CANDIDA SPECIES DNA (OHS): DETECTED
CTGC SOURCE (OHS) ORD-325: NORMAL
EAG (OHS): 105 MG/DL (ref 68–131)
HBA1C MFR BLD: 5.3 % (ref 4–5.6)
HBV SURFACE AG SERPL QL IA: NORMAL
HCV AB SERPL QL IA: NORMAL
N GONORRHOEA DNA UR QL NAA+PROBE: NOT DETECTED
T PALLIDUM IGG+IGM SER QL: NORMAL
TRICHOMONAS VAGINALIS DNA (OHS): NOT DETECTED

## 2025-06-03 PROCEDURE — 86803 HEPATITIS C AB TEST: CPT

## 2025-06-03 PROCEDURE — 87491 CHLMYD TRACH DNA AMP PROBE: CPT | Performed by: NURSE PRACTITIONER

## 2025-06-03 PROCEDURE — 3079F DIAST BP 80-89 MM HG: CPT | Mod: CPTII,S$GLB,, | Performed by: NURSE PRACTITIONER

## 2025-06-03 PROCEDURE — 86593 SYPHILIS TEST NON-TREP QUANT: CPT

## 2025-06-03 PROCEDURE — 1159F MED LIST DOCD IN RCRD: CPT | Mod: CPTII,S$GLB,, | Performed by: NURSE PRACTITIONER

## 2025-06-03 PROCEDURE — 1160F RVW MEDS BY RX/DR IN RCRD: CPT | Mod: CPTII,S$GLB,, | Performed by: NURSE PRACTITIONER

## 2025-06-03 PROCEDURE — 3074F SYST BP LT 130 MM HG: CPT | Mod: CPTII,S$GLB,, | Performed by: NURSE PRACTITIONER

## 2025-06-03 PROCEDURE — 99214 OFFICE O/P EST MOD 30 MIN: CPT | Mod: S$GLB,,, | Performed by: NURSE PRACTITIONER

## 2025-06-03 PROCEDURE — 99999 PR PBB SHADOW E&M-EST. PATIENT-LVL III: CPT | Mod: PBBFAC,,, | Performed by: NURSE PRACTITIONER

## 2025-06-03 PROCEDURE — 81515 NFCT DS BV&VAGINITIS DNA ALG: CPT | Performed by: NURSE PRACTITIONER

## 2025-06-03 PROCEDURE — 36415 COLL VENOUS BLD VENIPUNCTURE: CPT

## 2025-06-03 PROCEDURE — 83036 HEMOGLOBIN GLYCOSYLATED A1C: CPT

## 2025-06-03 PROCEDURE — 87340 HEPATITIS B SURFACE AG IA: CPT

## 2025-06-03 PROCEDURE — 3008F BODY MASS INDEX DOCD: CPT | Mod: CPTII,S$GLB,, | Performed by: NURSE PRACTITIONER

## 2025-06-03 RX ORDER — FLUCONAZOLE 150 MG/1
150 TABLET ORAL
Qty: 2 TABLET | Refills: 0 | Status: SHIPPED | OUTPATIENT
Start: 2025-06-03 | End: 2025-06-07

## 2025-06-09 ENCOUNTER — OFFICE VISIT (OUTPATIENT)
Dept: INFECTIOUS DISEASES | Facility: CLINIC | Age: 20
End: 2025-06-09
Payer: COMMERCIAL

## 2025-06-09 ENCOUNTER — PATIENT MESSAGE (OUTPATIENT)
Dept: INFECTIOUS DISEASES | Facility: CLINIC | Age: 20
End: 2025-06-09

## 2025-06-09 ENCOUNTER — TELEPHONE (OUTPATIENT)
Dept: INFECTIOUS DISEASES | Facility: CLINIC | Age: 20
End: 2025-06-09
Payer: COMMERCIAL

## 2025-06-09 DIAGNOSIS — Z29.81 ENCOUNTER FOR HIV PRE-EXPOSURE PROPHYLAXIS: Primary | ICD-10-CM

## 2025-06-09 PROCEDURE — 3044F HG A1C LEVEL LT 7.0%: CPT | Mod: CPTII,95,, | Performed by: INTERNAL MEDICINE

## 2025-06-09 PROCEDURE — 98004 SYNCH AUDIO-VIDEO EST SF 10: CPT | Mod: 95,,, | Performed by: INTERNAL MEDICINE

## 2025-06-09 NOTE — TELEPHONE ENCOUNTER
Attempted to contact patient to inform that Dr. Eugene will log on shortly for visit. Dr. Eugene logged on while call was being made.

## 2025-06-09 NOTE — PROGRESS NOTES
Subjective     Patient ID: Alec Araya is a 19 y.o. female.    Chief Complaint: Follow up     HPI  Last ID note-  19-year-old woman who presented to discuss initiation of parenteral prep she reports that she is unable to swallow pills. She has tried previous oral medications but had difficulty with swelling the medications with abdominal pain. She would like to try injectable prep options   06/08- she comes for follow up   Review of Systems   Constitutional:  Negative for activity change, appetite change, chills, diaphoresis and fatigue.   HENT:  Negative for dental problem.    Respiratory:  Negative for apnea.           Objective     Physical Exam  Vitals reviewed.   HENT:      Head: Normocephalic.   Cardiovascular:      Rate and Rhythm: Normal rate.   Pulmonary:      Effort: Pulmonary effort is normal.   Musculoskeletal:         General: Normal range of motion.      Cervical back: Normal range of motion.   Neurological:      General: No focal deficit present.      Mental Status: She is alert.            Assessment and Plan     1. Encounter for HIV pre-exposure prophylaxis  Assessment & Plan:  She is unable to tolerate oral medications for prep.  She is heterosexual.  We will plan to try IV Cabotegravir is an antiretroviral drug used to treat and prevent HIV-1 infection. It belongs to a class of medications called HIV integrase inhibitors.     Recommended Every-2-Month Dosing Schedule:   Outpatient Antibiotic Therapy Plan:     Please send referral to [facility].     1) Infection:  PREP     2) Discharge Antibiotics:   a single 600-mg (3-mL) intramuscular injection of APRETUDE given 1 month apart for 2 consecutive months (   Individuals may be given the second APRETUDE initiation injection up to 7 days before or after the date the individual is scheduled to receive the injections.   Continuation Injections After the 2 initiation injection doses given consecutively 1 month apart, the recommended  continuation injection dose of APRETUDE is a single 600-mg (3-mL) intramuscular injection of APRETUDE every 2 months. Individuals may be given APRETUDE up to 7 days before or after the date the individual is scheduled to receive the injections       3) Therapy Duration:       Estimated end date of IV antibiotics:      4) Outpatient Weekly Labs:     We will need to send HIV RNA prior to injection , we will also send scheduled CMP every 4 months

## 2025-06-09 NOTE — ASSESSMENT & PLAN NOTE
She is unable to tolerate oral medications for prep.  She is heterosexual.  We will plan to try IV Cabotegravir is an antiretroviral drug used to treat and prevent HIV-1 infection. It belongs to a class of medications called HIV integrase inhibitors.     Recommended Every-2-Month Dosing Schedule:   Outpatient Antibiotic Therapy Plan:     Please send referral to [facility].     1) Infection:  PREP     2) Discharge Antibiotics:   a single 600-mg (3-mL) intramuscular injection of APRETUDE given 1 month apart for 2 consecutive months (   Individuals may be given the second APRETUDE initiation injection up to 7 days before or after the date the individual is scheduled to receive the injections.   Continuation Injections After the 2 initiation injection doses given consecutively 1 month apart, the recommended continuation injection dose of APRETUDE is a single 600-mg (3-mL) intramuscular injection of APRETUDE every 2 months. Individuals may be given APRETUDE up to 7 days before or after the date the individual is scheduled to receive the injections       3) Therapy Duration:       Estimated end date of IV antibiotics:      4) Outpatient Weekly Labs:     We will need to send HIV RNA prior to injection , we will also send scheduled CMP every 4 months

## 2025-06-09 NOTE — PROGRESS NOTES
The patient location is: [place]  The chief complaint leading to consultation is: PREP    Visit type: audiovisual    Face to Face time with patient: 7  12 minutes of total time spent on the encounter, which includes face to face time and non-face to face time preparing to see the patient (eg, review of tests), Obtaining and/or reviewing separately obtained history, Documenting clinical information in the electronic or other health record, Independently interpreting results (not separately reported) and communicating results to the patient/family/caregiver, or Care coordination (not separately reported).         Each patient to whom he or she provides medical services by telemedicine is:  (1) informed of the relationship between the physician and patient and the respective role of any other health care provider with respect to management of the patient; and (2) notified that he or she may decline to receive medical services by telemedicine and may withdraw from such care at any time.    Notes:

## 2025-06-23 ENCOUNTER — LAB VISIT (OUTPATIENT)
Dept: LAB | Facility: OTHER | Age: 20
End: 2025-06-23
Attending: INTERNAL MEDICINE
Payer: COMMERCIAL

## 2025-06-23 DIAGNOSIS — Z29.81 ENCOUNTER FOR HIV PRE-EXPOSURE PROPHYLAXIS: ICD-10-CM

## 2025-06-23 LAB
ALBUMIN SERPL BCP-MCNC: 3.1 G/DL (ref 3.5–5.2)
ALP SERPL-CCNC: 88 UNIT/L (ref 40–150)
ALT SERPL W/O P-5'-P-CCNC: 11 UNIT/L (ref 10–44)
ANION GAP (OHS): 13 MMOL/L (ref 8–16)
AST SERPL-CCNC: 12 UNIT/L (ref 11–45)
BILIRUB SERPL-MCNC: 0.2 MG/DL (ref 0.1–1)
BUN SERPL-MCNC: 13 MG/DL (ref 6–20)
CALCIUM SERPL-MCNC: 9.1 MG/DL (ref 8.7–10.5)
CHLORIDE SERPL-SCNC: 108 MMOL/L (ref 95–110)
CO2 SERPL-SCNC: 19 MMOL/L (ref 23–29)
CREAT SERPL-MCNC: 0.9 MG/DL (ref 0.5–1.4)
GFR SERPLBLD CREATININE-BSD FMLA CKD-EPI: >60 ML/MIN/1.73/M2
GLUCOSE SERPL-MCNC: 98 MG/DL (ref 70–110)
POTASSIUM SERPL-SCNC: 3.3 MMOL/L (ref 3.5–5.1)
PROT SERPL-MCNC: 7.7 GM/DL (ref 6–8.4)
SODIUM SERPL-SCNC: 140 MMOL/L (ref 136–145)

## 2025-06-23 PROCEDURE — 80053 COMPREHEN METABOLIC PANEL: CPT

## 2025-06-23 PROCEDURE — 36415 COLL VENOUS BLD VENIPUNCTURE: CPT

## 2025-06-23 PROCEDURE — 87536 HIV-1 QUANT&REVRSE TRNSCRPJ: CPT

## 2025-06-24 LAB — HIV1 RNA SERPL NAA+PROBE-LOG#: NOT DETECTED {LOG_COPIES}/ML

## 2025-07-01 ENCOUNTER — PATIENT MESSAGE (OUTPATIENT)
Dept: INFECTIOUS DISEASES | Facility: CLINIC | Age: 20
End: 2025-07-01
Payer: COMMERCIAL

## 2025-07-17 ENCOUNTER — LAB VISIT (OUTPATIENT)
Dept: LAB | Facility: HOSPITAL | Age: 20
End: 2025-07-17
Attending: INTERNAL MEDICINE
Payer: COMMERCIAL

## 2025-07-17 DIAGNOSIS — Z29.81 ENCOUNTER FOR HIV PRE-EXPOSURE PROPHYLAXIS: ICD-10-CM

## 2025-07-17 PROCEDURE — 87536 HIV-1 QUANT&REVRSE TRNSCRPJ: CPT

## 2025-07-17 PROCEDURE — 36415 COLL VENOUS BLD VENIPUNCTURE: CPT | Mod: PN

## 2025-07-21 LAB — HIV1 RNA SERPL NAA+PROBE-LOG#: NOT DETECTED {LOG_COPIES}/ML

## 2025-07-22 DIAGNOSIS — J01.91 ACUTE RECURRENT SINUSITIS, UNSPECIFIED LOCATION: ICD-10-CM

## 2025-07-23 RX ORDER — FLUTICASONE PROPIONATE 50 MCG
2 SPRAY, SUSPENSION (ML) NASAL DAILY
Qty: 16 ML | Refills: 1 | Status: SHIPPED | OUTPATIENT
Start: 2025-07-23

## 2025-07-29 ENCOUNTER — PATIENT MESSAGE (OUTPATIENT)
Dept: INFECTIOUS DISEASES | Facility: CLINIC | Age: 20
End: 2025-07-29
Payer: COMMERCIAL

## 2025-08-27 ENCOUNTER — OFFICE VISIT (OUTPATIENT)
Dept: OBSTETRICS AND GYNECOLOGY | Facility: CLINIC | Age: 20
End: 2025-08-27
Payer: COMMERCIAL

## 2025-08-27 VITALS
HEIGHT: 67 IN | BODY MASS INDEX: 45.99 KG/M2 | DIASTOLIC BLOOD PRESSURE: 62 MMHG | SYSTOLIC BLOOD PRESSURE: 108 MMHG | WEIGHT: 293 LBS

## 2025-08-27 DIAGNOSIS — N92.1 BREAKTHROUGH BLEEDING ON BIRTH CONTROL PILLS: Primary | ICD-10-CM

## 2025-08-27 LAB
B-HCG UR QL: NEGATIVE
CTP QC/QA: YES

## 2025-08-27 PROCEDURE — 3044F HG A1C LEVEL LT 7.0%: CPT | Mod: CPTII,S$GLB,, | Performed by: NURSE PRACTITIONER

## 2025-08-27 PROCEDURE — 3074F SYST BP LT 130 MM HG: CPT | Mod: CPTII,S$GLB,, | Performed by: NURSE PRACTITIONER

## 2025-08-27 PROCEDURE — 1159F MED LIST DOCD IN RCRD: CPT | Mod: CPTII,S$GLB,, | Performed by: NURSE PRACTITIONER

## 2025-08-27 PROCEDURE — 3008F BODY MASS INDEX DOCD: CPT | Mod: CPTII,S$GLB,, | Performed by: NURSE PRACTITIONER

## 2025-08-27 PROCEDURE — 3078F DIAST BP <80 MM HG: CPT | Mod: CPTII,S$GLB,, | Performed by: NURSE PRACTITIONER

## 2025-08-27 PROCEDURE — 81025 URINE PREGNANCY TEST: CPT | Mod: S$GLB,,, | Performed by: NURSE PRACTITIONER

## 2025-08-27 PROCEDURE — 99999 PR PBB SHADOW E&M-EST. PATIENT-LVL III: CPT | Mod: PBBFAC,,, | Performed by: NURSE PRACTITIONER

## 2025-08-27 PROCEDURE — 99213 OFFICE O/P EST LOW 20 MIN: CPT | Mod: S$GLB,,, | Performed by: NURSE PRACTITIONER
